# Patient Record
Sex: FEMALE | Race: WHITE | NOT HISPANIC OR LATINO | Employment: FULL TIME | URBAN - METROPOLITAN AREA
[De-identification: names, ages, dates, MRNs, and addresses within clinical notes are randomized per-mention and may not be internally consistent; named-entity substitution may affect disease eponyms.]

---

## 2023-11-10 PROBLEM — I27.0 PULMONARY HYPERTENSION, PRIMARY (MULTI): Status: ACTIVE | Noted: 2023-11-10

## 2023-11-10 PROBLEM — E03.9 HYPOTHYROIDISM: Status: ACTIVE | Noted: 2023-11-10

## 2023-11-10 PROBLEM — R60.9 EDEMA: Status: ACTIVE | Noted: 2023-11-10

## 2023-11-10 PROBLEM — R06.02 SHORTNESS OF BREATH ON EXERTION: Status: ACTIVE | Noted: 2023-11-10

## 2023-11-10 PROBLEM — R93.1 ABNORMAL ECHOCARDIOGRAM: Status: ACTIVE | Noted: 2023-11-10

## 2023-11-10 PROBLEM — R12 HEART BURN: Status: ACTIVE | Noted: 2023-11-10

## 2023-11-10 PROBLEM — I10 HYPERTENSION: Status: ACTIVE | Noted: 2023-11-10

## 2023-11-10 RX ORDER — FUROSEMIDE 40 MG/1
60 TABLET ORAL DAILY
COMMUNITY

## 2023-11-10 RX ORDER — LISINOPRIL 40 MG/1
40 TABLET ORAL DAILY
COMMUNITY

## 2023-11-10 RX ORDER — OMEPRAZOLE 10 MG/1
10 CAPSULE, DELAYED RELEASE ORAL
COMMUNITY

## 2023-11-10 RX ORDER — ETONOGESTREL AND ETHINYL ESTRADIOL .12; .015 MG/D; MG/D
1 RING VAGINAL
COMMUNITY

## 2023-11-10 RX ORDER — CETIRIZINE HYDROCHLORIDE 10 MG/1
1 TABLET ORAL DAILY PRN
COMMUNITY

## 2023-11-10 RX ORDER — NIFEDIPINE 30 MG/1
30 TABLET, EXTENDED RELEASE ORAL DAILY
COMMUNITY

## 2023-11-10 RX ORDER — LEVOTHYROXINE SODIUM 75 UG/1
75 TABLET ORAL DAILY
COMMUNITY

## 2023-11-10 RX ORDER — TADALAFIL 20 MG/1
2 TABLET ORAL DAILY
COMMUNITY
Start: 2020-08-11

## 2023-11-10 RX ORDER — MACITENTAN 10 MG/1
10 TABLET, FILM COATED ORAL DAILY
COMMUNITY
Start: 2020-08-10

## 2023-11-10 NOTE — PROGRESS NOTES
History Of Present Illness  Chiquis Thompson is a 43 y.o. female being seen today for routine idiopathic pulmonary hypertension follow up. She was last seen in clinic on 7/26/23. Patient was referred by Dr. Machado in 2021. She has transitioned from Uptravi to Remodulin via Remunity, tolerating well. No new medical issues or admissions since last visit.     PAH Treatment: Opsumit (6/3/2020), tadalafil (9/1/2020), Remodulin subcutaneous 57 ng/kg/min (started 2/14/23), diuretics and nocturnal oxygen - ordered 2L HS, not currently using.   Infusion site: SubqQ current site dry, without redness, has been in for two weeks. Previous three subcutaneous sites have become red, painful with bloody pus after ~four weeks. Improved with neosporin and bandage by patient, did not alert PAH office.   Treatment history:  Selexipag to Remodulin subcutaneous Feb 2023.     11/15/23 - SOB and endurance stable, not worsened. Infusion side effects with increases: having diarrhea, but controlled after now starting using immodium ~every other day. Denies N/V/HA/jaw pain. Mild flushing. 6MWT today.      Past Medical History  Patient Active Problem List   Diagnosis    Abnormal echocardiogram    Edema    Heart burn    Hypertension    Hypothyroidism    Pulmonary hypertension, primary (CMS/HCC)    Shortness of breath on exertion        Surgical History  She has a past surgical history that includes Other surgical history (05/18/2020).     Social History  She has no history on file for tobacco use, alcohol use, and drug use.    Family History  No family history on file.     Medications      Current Outpatient Medications:     macitentan (Opsumit) 10 mg tablet tablet, Take 1 tablet (10 mg) by mouth once daily., Disp: , Rfl:     tadalafil (tadalafil, antihypertensive,) 20 mg tablet, Take 2 tablets (40 mg) by mouth once daily., Disp: , Rfl:     cetirizine (ZyrTEC) 10 mg tablet, Take 1 tablet (10 mg) by mouth once daily as needed., Disp: , Rfl:      EluRyng 0.12-0.015 mg/24 hr vaginal ring, Insert 1 each into the vagina every 28 (twenty-eight) days., Disp: , Rfl:     furosemide (Lasix) 40 mg tablet, Take 1.5 tablets (60 mg) by mouth once daily., Disp: , Rfl:     levothyroxine (Synthroid, Levoxyl) 75 mcg tablet, Take 1 tablet (75 mcg) by mouth once daily., Disp: , Rfl:     lisinopril 40 mg tablet, Take 1 tablet (40 mg) by mouth once daily., Disp: , Rfl:     NIFEdipine XL 30 mg 24 hr tablet, Take 1 tablet (30 mg) by mouth once daily., Disp: , Rfl:     omeprazole (PriLOSEC) 10 mg DR capsule, Take 1 capsule (10 mg) by mouth once daily in the morning. Take before meals., Disp: , Rfl:      Allergies  Patient has no allergy information on record.    Review of Systems   Constitutional:  Negative for fatigue.   Respiratory:  Positive for shortness of breath. Negative for cough, chest tightness and wheezing.         With exertion.    Cardiovascular:  Negative for chest pain, palpitations and leg swelling.   Gastrointestinal:  Positive for diarrhea. Negative for constipation, nausea and vomiting.   Neurological:  Negative for dizziness, syncope, light-headedness and headaches.       Last Recorded Vitals  Blood pressure 117/78, pulse 85, weight 93.6 kg (206 lb 6.4 oz), SpO2 95 %.     Physical Exam       Relevant Results            6MWT (11/15/23)      HR 85 - 134      Spo2 95 - 89      Yisel 0 - 6      607m     Echo (23) - RV moderately enlarged with preserved longitudinal systolic function. RA is mildly dilated.     6MWT (2023)  SpO2: 98-91% RA  HR:   Yisel: 0-5  Actual meters: 612; predicted 418 meters     6MWT (3/29/2023)  SpO2: 97-91% RA  HR:   Yisel: 0-6  Actual meters: 588; predicted 557m     Echo (23): moderately enlarged RV with normal function, mildly dilated RA         RHC (21): [at rest] PAp 62/23 (21), PW 14, C.O. 6.5 / C.I. 3.2; [post exercise] Pap 75/16 (48), WP 27   Assessment/Plan     1) Pulmonary - Echo, (-) V/Q, normal PFTs  "consistent with IPAH, FC 2+, some persistent edema now resolved with increased diuretic, significant initial improvement in walk, now stable. , denies further palpitations, no syncope. Has limiting side effects in up-titration of selexipag from 800-> 1000 ug forcing her to decrease back to 800 ug / 1000ug. She is now increased to 1600 ug with \"tolerable\" diarrhea. Patient not interested in Imodium.      No edema now with lasix 60mg daily and compression hose. Overall, patient better than presentation, performance data however seems better than echo and although improving, believe we are not at goal and unlikely to get there on current regimen. Last mPAP was 48 mm Hg. (August 2021) I advocated for infusion prostacyclin. Patient reluctant but considering.     Not at goal for modern guidelines as noted at last visit, her echo measurements are trivially worse today. I discussed the above. I discussed the fact that delay and/or deterioration can lead to non-recoverable losses and that I recommend infusion therapy in place of selexipag at this time. Succesfully transitioned to SC remodulin.            2) Cardiovascular - essential hypertension.     3) Endocrine  a. Hypothyroidism  b. Obesity (BMI = 37.2->36->34.22)     Plan     1) Continue macitentan and tadalafil.   2) Continue lasix to 60mg daily.  3) Labs to follow PAH, meds and diuretic use  4) Continue to escalate SC remodulin as tolerated at 7-10 day intervals for now.  5) Follow up 3-4 months with, 6 MW.   6) DO NOT SHOWER AND UNHOOK. CONSIDER MORE FREQUENT SITE ROTATION. PROMPTLY CALL OFFICE WITH INFECTION.         "

## 2023-11-15 ENCOUNTER — OFFICE VISIT (OUTPATIENT)
Dept: PULMONOLOGY | Facility: HOSPITAL | Age: 43
End: 2023-11-15
Payer: COMMERCIAL

## 2023-11-15 ENCOUNTER — HOSPITAL ENCOUNTER (OUTPATIENT)
Dept: RESPIRATORY THERAPY | Facility: HOSPITAL | Age: 43
Discharge: HOME | End: 2023-11-15
Payer: COMMERCIAL

## 2023-11-15 VITALS
WEIGHT: 206.4 LBS | SYSTOLIC BLOOD PRESSURE: 117 MMHG | OXYGEN SATURATION: 95 % | HEART RATE: 85 BPM | BODY MASS INDEX: 33.31 KG/M2 | DIASTOLIC BLOOD PRESSURE: 78 MMHG

## 2023-11-15 DIAGNOSIS — I27.20 PULMONARY HYPERTENSION (MULTI): ICD-10-CM

## 2023-11-15 DIAGNOSIS — R06.02 SHORTNESS OF BREATH ON EXERTION: ICD-10-CM

## 2023-11-15 DIAGNOSIS — I27.0 PULMONARY HYPERTENSION, PRIMARY (MULTI): ICD-10-CM

## 2023-11-15 PROBLEM — R00.2 PALPITATIONS: Status: ACTIVE | Noted: 2020-04-28

## 2023-11-15 PROBLEM — R42 LIGHT HEADEDNESS: Status: ACTIVE | Noted: 2020-04-21

## 2023-11-15 PROBLEM — I51.9 RIGHT VENTRICULAR DYSFUNCTION: Status: ACTIVE | Noted: 2020-05-04

## 2023-11-15 PROBLEM — E66.9 OBESITY (BMI 30-39.9): Status: ACTIVE | Noted: 2020-04-21

## 2023-11-15 PROBLEM — R09.02 HYPOXIA: Status: ACTIVE | Noted: 2021-02-12

## 2023-11-15 PROBLEM — I36.1 NONRHEUMATIC TRICUSPID VALVE REGURGITATION: Status: ACTIVE | Noted: 2020-05-04

## 2023-11-15 PROCEDURE — 94618 PULMONARY STRESS TESTING: CPT

## 2023-11-15 PROCEDURE — 3074F SYST BP LT 130 MM HG: CPT | Performed by: INTERNAL MEDICINE

## 2023-11-15 PROCEDURE — 99215 OFFICE O/P EST HI 40 MIN: CPT | Performed by: INTERNAL MEDICINE

## 2023-11-15 PROCEDURE — 3078F DIAST BP <80 MM HG: CPT | Performed by: INTERNAL MEDICINE

## 2023-11-15 RX ORDER — LIDOCAINE AND PRILOCAINE 25; 25 MG/G; MG/G
CREAM TOPICAL
COMMUNITY
Start: 2023-02-07 | End: 2024-03-21 | Stop reason: ALTCHOICE

## 2023-11-15 RX ORDER — TREPROSTINIL 5 MG/ML
2 INJECTION, SOLUTION INTRAVENOUS; SUBCUTANEOUS
COMMUNITY

## 2023-11-15 RX ORDER — TRIAMCINOLONE ACETONIDE 1 MG/G
CREAM TOPICAL
COMMUNITY
Start: 2023-02-06 | End: 2024-03-21 | Stop reason: ALTCHOICE

## 2023-11-15 ASSESSMENT — ENCOUNTER SYMPTOMS
NAUSEA: 0
HEADACHES: 0
DIARRHEA: 1
SHORTNESS OF BREATH: 1
CHEST TIGHTNESS: 0
FATIGUE: 0
COUGH: 0
VOMITING: 0
PALPITATIONS: 0
WHEEZING: 0
CONSTIPATION: 0
DIZZINESS: 0
LIGHT-HEADEDNESS: 0

## 2024-01-25 ENCOUNTER — TELEPHONE (OUTPATIENT)
Dept: PULMONOLOGY | Facility: HOSPITAL | Age: 44
End: 2024-01-25

## 2024-02-15 ENCOUNTER — APPOINTMENT (OUTPATIENT)
Dept: RESPIRATORY THERAPY | Facility: HOSPITAL | Age: 44
End: 2024-02-15
Payer: COMMERCIAL

## 2024-02-15 ENCOUNTER — APPOINTMENT (OUTPATIENT)
Dept: CARDIOLOGY | Facility: HOSPITAL | Age: 44
End: 2024-02-15
Payer: COMMERCIAL

## 2024-02-15 ENCOUNTER — APPOINTMENT (OUTPATIENT)
Dept: PULMONOLOGY | Facility: HOSPITAL | Age: 44
End: 2024-02-15
Payer: COMMERCIAL

## 2024-03-05 ASSESSMENT — ENCOUNTER SYMPTOMS
CONSTIPATION: 0
NAUSEA: 0
CHEST TIGHTNESS: 0
DIARRHEA: 1
LIGHT-HEADEDNESS: 0
FATIGUE: 0
WHEEZING: 0
PALPITATIONS: 0
SHORTNESS OF BREATH: 1
COUGH: 0
VOMITING: 0

## 2024-03-05 NOTE — PROGRESS NOTES
History Of Present Illness  Chiquis Thompson is a 43 y.o. female being seen today for routine idiopathic pulmonary hypertension follow up. She was last seen in clinic on 11/15/23. Patient was referred by Dr. Machado in 2021. She has transitioned from Uptravi to Remodulin via Remunity, tolerating well. No new medical issues or admissions since last visit.     PAH Treatment: Opsumit (6/3/2020), tadalafil (9/1/2020), Remodulin subcutaneous 57 ng/kg/min (started 2/14/23), diuretics and nocturnal oxygen - ordered 2L HS, not currently using.   Infusion site: SubqQ current site dry, without redness, has been in for two weeks.   Treatment history:  Selexipag to Remodulin subcutaneous Feb 2023.     3/21/24- SOB and endurance stable, not worsened. Infusion side effects with increases: having diarrhea, but controlled after now starting using immodium ~every other day. Denies N/V//jaw pain.  Always a headache. Mild flushing.  Lipoma removed from right upper abdomen  2 weeks ago. 6MWT, echo and labs today.          Past Medical History  Patient Active Problem List   Diagnosis    Abnormal echocardiogram    Edema    Heart burn    Hypertension    Hypothyroidism    Pulmonary hypertension, primary (CMS/HCC)    Shortness of breath on exertion    Hypoxia    Light headedness    Obesity (BMI 30-39.9)    Palpitations    Pulmonary hypertension (CMS/HCC)    Right ventricular dysfunction    Nonrheumatic tricuspid valve regurgitation        Surgical History  She has a past surgical history that includes Other surgical history (05/18/2020).     Social History  She reports that she has never smoked. She has never used smokeless tobacco. She reports that she does not drink alcohol and does not use drugs.    Family History  No family history on file.     Medications      Current Outpatient Medications:     cetirizine (ZyrTEC) 10 mg tablet, Take 1 tablet (10 mg) by mouth once daily as needed., Disp: , Rfl:     EluRyng 0.12-0.015 mg/24 hr vaginal ring,  "Insert 1 each into the vagina every 28 (twenty-eight) days., Disp: , Rfl:     furosemide (Lasix) 40 mg tablet, Take 1.5 tablets (60 mg) by mouth once daily., Disp: , Rfl:     levothyroxine (Synthroid, Levoxyl) 75 mcg tablet, Take 1 tablet (75 mcg) by mouth once daily., Disp: , Rfl:     lisinopril 40 mg tablet, Take 1 tablet (40 mg) by mouth once daily., Disp: , Rfl:     macitentan (Opsumit) 10 mg tablet tablet, Take 1 tablet (10 mg) by mouth once daily., Disp: , Rfl:     NIFEdipine XL 30 mg 24 hr tablet, Take 1 tablet (30 mg) by mouth once daily., Disp: , Rfl:     omeprazole (PriLOSEC) 10 mg DR capsule, Take 1 capsule (10 mg) by mouth once daily in the morning. Take before meals., Disp: , Rfl:     tadalafil (tadalafil, antihypertensive,) 20 mg tablet, Take 2 tablets (40 mg) by mouth once daily., Disp: , Rfl:     treprostinil (Remodulin) 5 mg/mL solution, 2 mL (10,000,000 ng)., Disp: , Rfl:      Allergies  Patient has no known allergies.    Review of Systems   Constitutional:  Negative for fatigue.   HENT:  Positive for congestion.    Respiratory:  Positive for shortness of breath. Negative for cough, chest tightness and wheezing.         With exertion.    Cardiovascular:  Negative for chest pain, palpitations and leg swelling.   Gastrointestinal:  Positive for diarrhea. Negative for constipation, nausea and vomiting.   Neurological:  Positive for headaches. Negative for dizziness, syncope and light-headedness.       Last Recorded Vitals  Blood pressure 111/55, pulse 85, height 1.676 m (5' 6\"), weight 94.3 kg (208 lb), SpO2 91 %.     Physical Exam  Vitals reviewed.   Constitutional:       Appearance: She is obese.   HENT:      Head: Normocephalic.      Nose: Nose normal.   Cardiovascular:      Rate and Rhythm: Normal rate and regular rhythm.      Pulses: Normal pulses.      Heart sounds: Normal heart sounds.   Pulmonary:      Effort: Pulmonary effort is normal.      Breath sounds: Normal breath sounds.   Abdominal: "      Palpations: Abdomen is soft.   Musculoskeletal:      Right lower leg: No edema.      Left lower leg: No edema.   Skin:     Findings: No rash.   Neurological:      General: No focal deficit present.      Mental Status: She is alert.   Psychiatric:         Mood and Affect: Mood normal.         Judgment: Judgment normal.            Relevant Results        6MWT (3/21/24)  HR   Spo2 95 - 89 on room air  Yisel 0 - 6  Meters 594        6MWT (11/15/23)      HR 85 - 134      Spo2 95 - 89      Yisel 0 - 6      607m     Echo (23) - RV moderately enlarged with preserved longitudinal systolic function. RA is mildly dilated.     6MWT (2023)  SpO2: 98-91% RA  HR:   Yisel: 0-5  Actual meters: 612; predicted 418 meters     6MWT (3/29/2023)  SpO2: 97-91% RA  HR:   Yisel: 0-6  Actual meters: 588; predicted 557m     Echo (23): moderately enlarged RV with normal function, mildly dilated RA         RHC (21): [at rest] PAp 62/23 (21), PW 14, C.O. 6.5 / C.I. 3.2; [post exercise] Pap 75/16 (48), WP 27   Assessment/Plan     1) Pulmonary - Echo, (-) V/Q, normal PFTs consistent with IPAH, FC 2+, some persistent edema now resolved with increased diuretic. Trial on triple oral which ultimately was transitioned to subcutaneous remodulin with improvement. Stable function today.      No edema now with lasix 60mg daily and compression hose. Overall, patient better than presentation, performance data however seems better than echo and although improving, believe we are not at goal and unlikely to get there on current regimen. Last mPAP was 48 mm Hg. (2021) I advocated for infusion prostacyclin. Patient reluctant but considering.     Not at goal for modern guidelines as noted at last visit, her echo measurements are trivially worse today. I discussed the above. I discussed the fact that delay and/or deterioration can lead to non-recoverable losses and that I recommend infusion therapy in place of selexipag  at this time. Succesfully transitioned to SC remodulin.            2) Cardiovascular - essential hypertension.     3) Endocrine  a. Hypothyroidism  b. Obesity (BMI = 37.2->36->34.22)     Plan     1) Continue macitentan and tadalafil.   2) Continue lasix to 60mg daily.  3) Labs to follow PAH, meds and diuretic use  4) No change remoduline  5) Follow up 3 months with 6 MW, echo , cath late summer.  6) DO NOT SHOWER AND UNHOOK. CONSIDER MORE FREQUENT SITE ROTATION. PROMPTLY CALL OFFICE WITH INFECTION.

## 2024-03-21 ENCOUNTER — APPOINTMENT (OUTPATIENT)
Dept: CARDIOLOGY | Facility: HOSPITAL | Age: 44
End: 2024-03-21
Payer: COMMERCIAL

## 2024-03-21 ENCOUNTER — OFFICE VISIT (OUTPATIENT)
Dept: PULMONOLOGY | Facility: HOSPITAL | Age: 44
End: 2024-03-21
Payer: COMMERCIAL

## 2024-03-21 ENCOUNTER — HOSPITAL ENCOUNTER (OUTPATIENT)
Dept: CARDIOLOGY | Facility: HOSPITAL | Age: 44
Discharge: HOME | End: 2024-03-21
Payer: COMMERCIAL

## 2024-03-21 ENCOUNTER — HOSPITAL ENCOUNTER (OUTPATIENT)
Dept: RESPIRATORY THERAPY | Facility: HOSPITAL | Age: 44
Discharge: HOME | End: 2024-03-21
Payer: COMMERCIAL

## 2024-03-21 ENCOUNTER — APPOINTMENT (OUTPATIENT)
Dept: RESPIRATORY THERAPY | Facility: HOSPITAL | Age: 44
End: 2024-03-21
Payer: COMMERCIAL

## 2024-03-21 VITALS
DIASTOLIC BLOOD PRESSURE: 55 MMHG | HEIGHT: 66 IN | WEIGHT: 208 LBS | OXYGEN SATURATION: 91 % | SYSTOLIC BLOOD PRESSURE: 111 MMHG | BODY MASS INDEX: 33.43 KG/M2 | HEART RATE: 85 BPM

## 2024-03-21 DIAGNOSIS — I27.0 IDIOPATHIC PAH (PULMONARY ARTERIAL HYPERTENSION) (MULTI): ICD-10-CM

## 2024-03-21 DIAGNOSIS — I27.0 PULMONARY HYPERTENSION, PRIMARY (MULTI): ICD-10-CM

## 2024-03-21 DIAGNOSIS — R06.02 SHORTNESS OF BREATH: ICD-10-CM

## 2024-03-21 PROCEDURE — 1036F TOBACCO NON-USER: CPT | Performed by: INTERNAL MEDICINE

## 2024-03-21 PROCEDURE — 93306 TTE W/DOPPLER COMPLETE: CPT | Performed by: STUDENT IN AN ORGANIZED HEALTH CARE EDUCATION/TRAINING PROGRAM

## 2024-03-21 PROCEDURE — 99215 OFFICE O/P EST HI 40 MIN: CPT | Performed by: INTERNAL MEDICINE

## 2024-03-21 PROCEDURE — 94618 PULMONARY STRESS TESTING: CPT | Performed by: STUDENT IN AN ORGANIZED HEALTH CARE EDUCATION/TRAINING PROGRAM

## 2024-03-21 PROCEDURE — 94618 PULMONARY STRESS TESTING: CPT

## 2024-03-21 PROCEDURE — 3074F SYST BP LT 130 MM HG: CPT | Performed by: INTERNAL MEDICINE

## 2024-03-21 PROCEDURE — 93306 TTE W/DOPPLER COMPLETE: CPT

## 2024-03-21 PROCEDURE — 3078F DIAST BP <80 MM HG: CPT | Performed by: INTERNAL MEDICINE

## 2024-03-21 ASSESSMENT — LIFESTYLE VARIABLES
HOW OFTEN DO YOU HAVE A DRINK CONTAINING ALCOHOL: MONTHLY OR LESS
AUDIT-C TOTAL SCORE: 1
SKIP TO QUESTIONS 9-10: 1
HOW MANY STANDARD DRINKS CONTAINING ALCOHOL DO YOU HAVE ON A TYPICAL DAY: 1 OR 2
HOW OFTEN DO YOU HAVE SIX OR MORE DRINKS ON ONE OCCASION: NEVER

## 2024-03-21 ASSESSMENT — ENCOUNTER SYMPTOMS
OCCASIONAL FEELINGS OF UNSTEADINESS: 0
HEADACHES: 1
LOSS OF SENSATION IN FEET: 0
DIZZINESS: 0
DEPRESSION: 0

## 2024-03-22 LAB
AORTIC VALVE MEAN GRADIENT: 8.6 MMHG
AORTIC VALVE PEAK VELOCITY: 1.99 M/S
AV PEAK GRADIENT: 15.8 MMHG
AVA (PEAK VEL): 1.97 CM2
AVA (VTI): 2.21 CM2
EJECTION FRACTION APICAL 4 CHAMBER: 71.3
EJECTION FRACTION: 68 %
LEFT ATRIUM VOLUME AREA LENGTH INDEX BSA: 36.5 ML/M2
LEFT VENTRICLE INTERNAL DIMENSION DIASTOLE: 4.83 CM (ref 3.5–6)
LEFT VENTRICULAR OUTFLOW TRACT DIAMETER: 2.2 CM
MITRAL VALVE E/A RATIO: 1.36
MITRAL VALVE E/E' RATIO: 8.25
RIGHT VENTRICLE FREE WALL PEAK S': 11.35 CM/S
RIGHT VENTRICLE PEAK SYSTOLIC PRESSURE: 59 MMHG
TRICUSPID ANNULAR PLANE SYSTOLIC EXCURSION: 2.1 CM

## 2024-03-26 DIAGNOSIS — E87.6 HYPOKALEMIA: ICD-10-CM

## 2024-03-26 RX ORDER — POTASSIUM CHLORIDE 20 MEQ/1
20 TABLET, EXTENDED RELEASE ORAL DAILY
Qty: 90 TABLET | Refills: 3 | Status: SHIPPED | OUTPATIENT
Start: 2024-03-26 | End: 2025-03-26

## 2024-03-27 ENCOUNTER — DOCUMENTATION (OUTPATIENT)
Dept: PULMONOLOGY | Facility: HOSPITAL | Age: 44
End: 2024-03-27
Payer: COMMERCIAL

## 2024-03-27 DIAGNOSIS — I27.0 PULMONARY HYPERTENSION, PRIMARY (MULTI): Primary | ICD-10-CM

## 2024-03-27 NOTE — PROGRESS NOTES
Received lab results from [facility]. Reviewed with Dr. Rodriguez who stated that he wants her to start taking KCl 20 meq with her furosemide due to mild hypokalemia. Called patient who verified understanding, and confirmed pharmacy for new prescription.

## 2024-03-28 ENCOUNTER — HOSPITAL ENCOUNTER (OUTPATIENT)
Dept: RESPIRATORY THERAPY | Facility: HOSPITAL | Age: 44
Discharge: HOME | End: 2024-03-28
Attending: INTERNAL MEDICINE
Payer: COMMERCIAL

## 2024-03-28 DIAGNOSIS — I27.0 IDIOPATHIC PAH (PULMONARY ARTERIAL HYPERTENSION) (MULTI): ICD-10-CM

## 2024-03-28 PROCEDURE — 94453 HAST W/SUPPL OXYGEN TITRJ: CPT

## 2024-03-28 PROCEDURE — 94453 HAST W/SUPPL OXYGEN TITRJ: CPT | Performed by: INTERNAL MEDICINE

## 2024-03-28 NOTE — PROCEDURES
Pt. Tolerated tight mask and sub-ambient FiO2 during Flight sim.  P.ox decreased to 87% after 14.5 min. on 0.152 FiO2 and O2 started to [place] under mask at 1 lpm.  P.ox quickly increased to >95%.  Pt. Denied dyspnea or dizziness throughout testing.  She did c/o stuffed up nose and sucked in mucous occasional through her nose.  Documentation report sent to Dr. TOMEKA Rodriguez for interpretation.

## 2024-03-29 ENCOUNTER — TELEPHONE (OUTPATIENT)
Dept: PULMONOLOGY | Facility: HOSPITAL | Age: 44
End: 2024-03-29
Payer: COMMERCIAL

## 2024-03-29 NOTE — TELEPHONE ENCOUNTER
Patient called back, let her know that the test showed she does in oxygen when flying. Patient does have an oxygen concentrator at home for nocturnal use, she is going to see who is currently supplying it and give us a call back so we can reach out to them about getting a prescription for TSA compliant O2 tank.

## 2024-03-29 NOTE — TELEPHONE ENCOUNTER
Left message for patient to discuss Flight Sim testing. Need to discuss current oxygen needs and flight oxygen needs. Waiting for patient to call back.

## 2024-04-02 DIAGNOSIS — I27.0 PULMONARY HYPERTENSION, PRIMARY (MULTI): Primary | ICD-10-CM

## 2024-04-03 ENCOUNTER — TELEPHONE (OUTPATIENT)
Dept: PULMONOLOGY | Facility: HOSPITAL | Age: 44
End: 2024-04-03
Payer: COMMERCIAL

## 2024-04-03 NOTE — TELEPHONE ENCOUNTER
Flight simulation test done. Pt requested letter for TSA to fly with oxygen. Orders faxed to [employer] for oxygen concentrator and supplies.

## 2024-04-15 ENCOUNTER — DOCUMENTATION (OUTPATIENT)
Dept: PULMONOLOGY | Facility: HOSPITAL | Age: 44
End: 2024-04-15
Payer: COMMERCIAL

## 2024-04-15 NOTE — PROGRESS NOTES
Patient called in requesting a physician consent form for use of a portable oxygen concentrator form through [employer] for upcoming travel.  Will send patient signed copy of form in the mail.

## 2024-05-07 ENCOUNTER — TELEPHONE (OUTPATIENT)
Dept: PULMONOLOGY | Facility: HOSPITAL | Age: 44
End: 2024-05-07
Payer: COMMERCIAL

## 2024-05-07 NOTE — TELEPHONE ENCOUNTER
Called patient to check and verify that she had her oxygen tank for her upcoming flight. Patient verified it was being delivered either Wednesday or Thursday of this week and she would have it for her flight. Her insurance company denied the request, so she had to go through a different company and rent the oxygen tank.

## 2024-06-06 DIAGNOSIS — I27.0 PULMONARY HYPERTENSION, PRIMARY (MULTI): Primary | ICD-10-CM

## 2024-06-06 DIAGNOSIS — R06.02 SHORTNESS OF BREATH ON EXERTION: ICD-10-CM

## 2024-06-10 ENCOUNTER — DOCUMENTATION (OUTPATIENT)
Dept: PULMONOLOGY | Facility: HOSPITAL | Age: 44
End: 2024-06-10
Payer: COMMERCIAL

## 2024-06-13 NOTE — PROGRESS NOTES
History Of Present Illness  Chiquis Thompson is a 43 y.o. female presenting with  routine idiopathic pulmonary hypertension follow up. She was last seen in clinic on 03/21/24. Patient was referred by Dr. Machado in 2021. She has transitioned from Uptravi to Remodulin via Remunity, tolerating well. No new medical issues or admissions since last visit.  . Patient is NYHA Functional Class 2 and WHO Group 1.     3/21/24- SOB and endurance stable, not worsened. Infusion side effects with increases: having diarrhea, but controlled after now starting using immodium ~every other day. Denies N/V//jaw pain.  Always a headache. Mild flushing.  Lipoma removed from right upper abdomen  2 weeks ago. 6MWT, echo and labs today.     PAH Treatment:   Opsumit (6/3/2020)  Tadalafil (9/1/2020)  Remodulin subcutaneous 57 ng/kg/min (started 2/14/23),   Diuretics  nocturnal oxygen - ordered 2L HS, not currently using.     Infusion site: SubqQ current site dry, with slight redness, changed last Tuesday.   Treatment history:  Selexipag to Remodulin subcutaneous Feb 2023.       06/13/24  Testing today includes labs and 6MWT    Interval History     Patient reporting feeling dizzy and lightheaded after beginning torsemide. Spoke with cardiologist who decreased dose from 40mg to 20mg and symptoms. Changed subcutaneous remodulin last Tuesday. Site with a little redness but reports has improved since first change. Feels like SOB is about the same since last visit.     Past Medical History  Patient Active Problem List   Diagnosis    Abnormal echocardiogram    Edema    Heart burn    Hypertension    Hypothyroidism    Pulmonary hypertension, primary (Multi)    Shortness of breath on exertion    Hypoxia    Light headedness    Obesity (BMI 30-39.9)    Palpitations    Pulmonary hypertension (Multi)    Right ventricular dysfunction    Nonrheumatic tricuspid valve regurgitation        Surgical History  She has a past surgical history that includes Other  surgical history (05/18/2020).     Social History  She reports that she has never smoked. She has never used smokeless tobacco. She reports that she does not drink alcohol and does not use drugs.    Family History  No family history on file.     Medications      Current Outpatient Medications:     cetirizine (ZyrTEC) 10 mg tablet, Take 1 tablet (10 mg) by mouth once daily as needed., Disp: , Rfl:     EluRyng 0.12-0.015 mg/24 hr vaginal ring, Insert 1 each into the vagina every 28 (twenty-eight) days., Disp: , Rfl:     furosemide (Lasix) 40 mg tablet, Take 1.5 tablets (60 mg) by mouth once daily., Disp: , Rfl:     levothyroxine (Synthroid, Levoxyl) 75 mcg tablet, Take 1 tablet (75 mcg) by mouth once daily., Disp: , Rfl:     lisinopril 40 mg tablet, Take 1 tablet (40 mg) by mouth once daily., Disp: , Rfl:     macitentan (Opsumit) 10 mg tablet tablet, Take 1 tablet (10 mg) by mouth once daily., Disp: , Rfl:     NIFEdipine XL 30 mg 24 hr tablet, Take 1 tablet (30 mg) by mouth once daily., Disp: , Rfl:     omeprazole (PriLOSEC) 10 mg DR capsule, Take 1 capsule (10 mg) by mouth once daily in the morning. Take before meals., Disp: , Rfl:     potassium chloride CR (Klor-Con M20) 20 mEq ER tablet, Take 1 tablet (20 mEq) by mouth once daily. Do not crush or chew., Disp: 90 tablet, Rfl: 3    tadalafil (tadalafil, antihypertensive,) 20 mg tablet, Take 2 tablets (40 mg) by mouth once daily., Disp: , Rfl:     treprostinil (Remodulin) 5 mg/mL solution, 2 mL (10,000,000 ng)., Disp: , Rfl:      Allergies  Patient has no known allergies.    Review of Systems   Constitutional:  Negative for appetite change, chills, fatigue, fever and unexpected weight change.   Respiratory:  Negative for cough, chest tightness, shortness of breath and wheezing.    Cardiovascular:  Negative for chest pain, palpitations and leg swelling.   Gastrointestinal:  Negative for abdominal distention, abdominal pain, constipation, diarrhea, nausea and vomiting.    Neurological:  Positive for dizziness and light-headedness. Negative for syncope and headaches.       Last Recorded Vitals  There were no vitals taken for this visit.  Vitals:    24 1022   BP: 145/71   Pulse: 77   SpO2: 97%         Physical Exam  HENT:      Head: Normocephalic.   Cardiovascular:      Rate and Rhythm: Normal rate and regular rhythm.   Pulmonary:      Effort: Pulmonary effort is normal.      Breath sounds: Normal breath sounds.   Abdominal:      General: Bowel sounds are normal.      Palpations: Abdomen is soft.   Musculoskeletal:      Right lower leg: No edema.      Left lower leg: No edema.   Skin:     Findings: No rash.   Neurological:      General: No focal deficit present.      Mental Status: She is alert.   Psychiatric:         Mood and Affect: Mood normal.         Judgment: Judgment normal.            Relevant Results    6MWT (2024) room air  SPO2: 97/91  HR: 77/134  Yisel: 0/2  Meters: 610    6MWT (3/21/24)  HR   Spo2 95 - 89 on room air  Yisel 0 - 6  Meters 594    6MWT (11/15/23)  HR 85 - 134  Spo2 95 - 89  Yisel 0 - 6  607m    6MWT (2023)  SpO2: 98-91% RA  HR:   Yisel: 0-5  Actual meters: 612; predicted 418 meters     6MWT (3/29/2023)  SpO2: 97-91% RA  HR:   Yisel: 0-6  Actual meters: 588; predicted 557m    Echo (2024)  Right Ventricle: The right ventricle is upper limits of normal in size. There is normal right ventricular global systolic function.  Right Atrium: The right atrium is normal in size.    Echo (3/21/2024)  Right Ventricle: The right ventricle is mildly enlarged. There is normal right ventricular global systolic function.  Right Atrium: The right atrium is mildly dilated.    Echo (23)    RV moderately enlarged with preserved longitudinal systolic function.   RA is mildly dilated.      Echo (23)  moderately enlarged RV with normal function,  RA mildly dilated      RHC (21)  [at rest]   Pap: 62/23 (21)  PW: 14  CO/CI:  6.5/3.2  [post exercise]  Pap 75/16 (48),   PW 27      Assessment/Plan   1) Pulmonary - Echo, (-) V/Q, normal PFTs consistent with IPAH, FC 2+, some persistent edema now resolved with increased diuretic. Trial on triple oral which ultimately was transitioned to subcutaneous remodulin with improvement. Stable function today. Still plan to RHC later this summer to assess for sotatercept.     No edema now with torsemide and aldactone. Overall, patient better than last year, performance data however seems better than echo and although improving, believe we are not at goal and unlikely to get there on current regimen. Last mPAP was 48 mm Hg. (August 2021) I advocated for infusion prostacyclin. Patient reluctant but ultimately agreed in 2023, subcutaneous remodulin from selexipag.                 2) Cardiovascular - essential hypertension.     3) Endocrine  a. Hypothyroidism  b. Obesity (BMI = 37.2->36->34.22)     Plan    1) Continue macitentan and tadalafil, remodulin  2) Cath late summer before follow up   3) Follow up 3 months with 6 MW, will review cath and discuss sotatercept if indicated.     Discussed with patient, last week in August is vacation and likely good timing for Right heart cath.                          PAST MEDICAL HISTORY:  Subarachnoid hemorrhage

## 2024-06-18 ENCOUNTER — HOSPITAL ENCOUNTER (OUTPATIENT)
Dept: CARDIOLOGY | Facility: HOSPITAL | Age: 44
Discharge: HOME | End: 2024-06-18
Payer: COMMERCIAL

## 2024-06-18 DIAGNOSIS — R06.02 SHORTNESS OF BREATH ON EXERTION: ICD-10-CM

## 2024-06-18 DIAGNOSIS — I27.0 PULMONARY HYPERTENSION, PRIMARY (MULTI): ICD-10-CM

## 2024-06-18 PROCEDURE — 93306 TTE W/DOPPLER COMPLETE: CPT

## 2024-06-18 PROCEDURE — 2500000004 HC RX 250 GENERAL PHARMACY W/ HCPCS (ALT 636 FOR OP/ED): Performed by: INTERNAL MEDICINE

## 2024-06-18 PROCEDURE — 93306 TTE W/DOPPLER COMPLETE: CPT | Performed by: INTERNAL MEDICINE

## 2024-06-19 LAB
AORTIC VALVE MEAN GRADIENT: 11.7 MMHG
AORTIC VALVE PEAK VELOCITY: 2.24 M/S
AV PEAK GRADIENT: 20.1 MMHG
AVA (PEAK VEL): 2.22 CM2
AVA (VTI): 2.38 CM2
EJECTION FRACTION APICAL 4 CHAMBER: 63.8
LEFT ATRIUM VOLUME AREA LENGTH INDEX BSA: 25.1 ML/M2
LEFT VENTRICLE INTERNAL DIMENSION DIASTOLE: 4.64 CM (ref 3.5–6)
LEFT VENTRICULAR OUTFLOW TRACT DIAMETER: 1.99 CM
LV EJECTION FRACTION BIPLANE: 62 %
MITRAL VALVE E/A RATIO: 0.81
MITRAL VALVE E/E' RATIO: 5.56
RIGHT VENTRICLE FREE WALL PEAK S': 20.14 CM/S
TRICUSPID ANNULAR PLANE SYSTOLIC EXCURSION: 2.9 CM

## 2024-06-24 ENCOUNTER — APPOINTMENT (OUTPATIENT)
Dept: RESPIRATORY THERAPY | Facility: HOSPITAL | Age: 44
End: 2024-06-24
Payer: COMMERCIAL

## 2024-06-24 ENCOUNTER — HOSPITAL ENCOUNTER (OUTPATIENT)
Dept: RESPIRATORY THERAPY | Facility: HOSPITAL | Age: 44
Discharge: HOME | End: 2024-06-24
Payer: COMMERCIAL

## 2024-06-24 ENCOUNTER — OFFICE VISIT (OUTPATIENT)
Dept: PULMONOLOGY | Facility: HOSPITAL | Age: 44
End: 2024-06-24
Payer: COMMERCIAL

## 2024-06-24 VITALS
SYSTOLIC BLOOD PRESSURE: 145 MMHG | WEIGHT: 201.6 LBS | HEIGHT: 66 IN | DIASTOLIC BLOOD PRESSURE: 71 MMHG | OXYGEN SATURATION: 97 % | HEART RATE: 77 BPM | BODY MASS INDEX: 32.4 KG/M2

## 2024-06-24 DIAGNOSIS — Z79.899 LONG-TERM USE OF HIGH-RISK MEDICATION: ICD-10-CM

## 2024-06-24 DIAGNOSIS — I27.20 PULMONARY HYPERTENSION (MULTI): Primary | ICD-10-CM

## 2024-06-24 DIAGNOSIS — I27.20 PULMONARY HYPERTENSION (MULTI): ICD-10-CM

## 2024-06-24 DIAGNOSIS — I27.0 IDIOPATHIC PAH (PULMONARY ARTERIAL HYPERTENSION) (MULTI): ICD-10-CM

## 2024-06-24 PROCEDURE — 94618 PULMONARY STRESS TESTING: CPT

## 2024-06-24 PROCEDURE — 1036F TOBACCO NON-USER: CPT | Performed by: INTERNAL MEDICINE

## 2024-06-24 PROCEDURE — 99215 OFFICE O/P EST HI 40 MIN: CPT | Performed by: INTERNAL MEDICINE

## 2024-06-24 PROCEDURE — 3078F DIAST BP <80 MM HG: CPT | Performed by: INTERNAL MEDICINE

## 2024-06-24 PROCEDURE — 3077F SYST BP >= 140 MM HG: CPT | Performed by: INTERNAL MEDICINE

## 2024-06-24 PROCEDURE — 94618 PULMONARY STRESS TESTING: CPT | Performed by: INTERNAL MEDICINE

## 2024-06-24 RX ORDER — SPIRONOLACTONE 25 MG/1
25 TABLET ORAL
COMMUNITY
Start: 2024-05-29 | End: 2025-05-29

## 2024-06-24 RX ORDER — TORSEMIDE 20 MG/1
40 TABLET ORAL
COMMUNITY
Start: 2024-05-29 | End: 2025-05-29

## 2024-06-24 ASSESSMENT — ENCOUNTER SYMPTOMS
DIARRHEA: 0
CHEST TIGHTNESS: 0
UNEXPECTED WEIGHT CHANGE: 0
NAUSEA: 0
VOMITING: 0
ABDOMINAL PAIN: 0
CHILLS: 0
WHEEZING: 0
COUGH: 0
LIGHT-HEADEDNESS: 1
HEADACHES: 0
APPETITE CHANGE: 0
FATIGUE: 0
ABDOMINAL DISTENTION: 0
SHORTNESS OF BREATH: 0
PALPITATIONS: 0
FEVER: 0
DIZZINESS: 1
CONSTIPATION: 0

## 2024-06-25 ENCOUNTER — HOSPITAL ENCOUNTER (OUTPATIENT)
Facility: HOSPITAL | Age: 44
Setting detail: OUTPATIENT SURGERY
End: 2024-06-25
Attending: INTERNAL MEDICINE | Admitting: INTERNAL MEDICINE
Payer: COMMERCIAL

## 2024-06-25 DIAGNOSIS — I27.20 PULMONARY HYPERTENSION (MULTI): ICD-10-CM

## 2024-06-25 DIAGNOSIS — R06.02 SOB (SHORTNESS OF BREATH): ICD-10-CM

## 2024-07-30 ENCOUNTER — DOCUMENTATION (OUTPATIENT)
Dept: PULMONOLOGY | Facility: HOSPITAL | Age: 44
End: 2024-07-30
Payer: COMMERCIAL

## 2024-07-30 NOTE — PROGRESS NOTES
Patient rescheduled for right heart cath for 8/28 at [facility],  1st case. Pt aware.

## 2024-09-20 ENCOUNTER — DOCUMENTATION (OUTPATIENT)
Dept: PULMONOLOGY | Facility: HOSPITAL | Age: 44
End: 2024-09-20
Payer: COMMERCIAL

## 2024-09-20 NOTE — PROGRESS NOTES
Pt called office to check in regarding right heart cath scheduling at [facility]. RN reported we have been in contact with [facility] Coordinators regarding this issue and will update her next week.

## 2024-09-23 ENCOUNTER — DOCUMENTATION (OUTPATIENT)
Dept: PULMONOLOGY | Facility: HOSPITAL | Age: 44
End: 2024-09-23
Payer: COMMERCIAL

## 2024-10-16 ENCOUNTER — HOSPITAL ENCOUNTER (OUTPATIENT)
Dept: CARDIOLOGY | Facility: HOSPITAL | Age: 44
Discharge: HOME | End: 2024-10-16
Payer: COMMERCIAL

## 2024-10-28 ENCOUNTER — OFFICE VISIT (OUTPATIENT)
Dept: PULMONOLOGY | Facility: HOSPITAL | Age: 44
End: 2024-10-28
Payer: COMMERCIAL

## 2024-10-28 ENCOUNTER — HOSPITAL ENCOUNTER (OUTPATIENT)
Dept: RESPIRATORY THERAPY | Facility: HOSPITAL | Age: 44
Discharge: HOME | End: 2024-10-28
Payer: COMMERCIAL

## 2024-10-28 VITALS
HEIGHT: 67 IN | BODY MASS INDEX: 31.23 KG/M2 | SYSTOLIC BLOOD PRESSURE: 105 MMHG | DIASTOLIC BLOOD PRESSURE: 69 MMHG | HEART RATE: 91 BPM | OXYGEN SATURATION: 95 % | WEIGHT: 199 LBS

## 2024-10-28 DIAGNOSIS — I27.20 PULMONARY HYPERTENSION (MULTI): Primary | ICD-10-CM

## 2024-10-28 DIAGNOSIS — Z79.899 LONG-TERM USE OF HIGH-RISK MEDICATION: ICD-10-CM

## 2024-10-28 DIAGNOSIS — R06.02 SOB (SHORTNESS OF BREATH): ICD-10-CM

## 2024-10-28 DIAGNOSIS — I27.20 PULMONARY HYPERTENSION (MULTI): ICD-10-CM

## 2024-10-28 DIAGNOSIS — R06.02 SHORTNESS OF BREATH: ICD-10-CM

## 2024-10-28 PROCEDURE — 99215 OFFICE O/P EST HI 40 MIN: CPT | Mod: 25 | Performed by: INTERNAL MEDICINE

## 2024-10-28 PROCEDURE — 3008F BODY MASS INDEX DOCD: CPT | Performed by: INTERNAL MEDICINE

## 2024-10-28 PROCEDURE — 3078F DIAST BP <80 MM HG: CPT | Performed by: INTERNAL MEDICINE

## 2024-10-28 PROCEDURE — 94618 PULMONARY STRESS TESTING: CPT | Performed by: STUDENT IN AN ORGANIZED HEALTH CARE EDUCATION/TRAINING PROGRAM

## 2024-10-28 PROCEDURE — 94618 PULMONARY STRESS TESTING: CPT

## 2024-10-28 PROCEDURE — 3074F SYST BP LT 130 MM HG: CPT | Performed by: INTERNAL MEDICINE

## 2024-10-28 PROCEDURE — 99215 OFFICE O/P EST HI 40 MIN: CPT | Performed by: INTERNAL MEDICINE

## 2024-10-28 ASSESSMENT — ENCOUNTER SYMPTOMS
SHORTNESS OF BREATH: 0
DIZZINESS: 1
ALLERGIC/IMMUNOLOGIC NEGATIVE: 1
HEMATOLOGIC/LYMPHATIC NEGATIVE: 1
LIGHT-HEADEDNESS: 0
DIARRHEA: 1
PALPITATIONS: 0
MUSCULOSKELETAL NEGATIVE: 1
HEADACHES: 0
PSYCHIATRIC NEGATIVE: 1
CONSTITUTIONAL NEGATIVE: 1
EYES NEGATIVE: 1
ENDOCRINE NEGATIVE: 1

## 2024-10-28 ASSESSMENT — PAIN SCALES - GENERAL: PAINLEVEL_OUTOF10: 0-NO PAIN

## 2024-11-01 ENCOUNTER — DOCUMENTATION (OUTPATIENT)
Dept: PULMONOLOGY | Facility: HOSPITAL | Age: 44
End: 2024-11-01
Payer: COMMERCIAL

## 2024-11-13 DIAGNOSIS — I27.20 PULMONARY HYPERTENSION (MULTI): ICD-10-CM

## 2024-11-13 RX ORDER — TADALAFIL 20 MG/1
40 TABLET ORAL DAILY
Qty: 10 TABLET | Refills: 11 | Status: SHIPPED | OUTPATIENT
Start: 2024-11-13 | End: 2024-11-13 | Stop reason: SDUPTHER

## 2024-11-14 RX ORDER — TADALAFIL 20 MG/1
40 TABLET ORAL DAILY
Qty: 60 TABLET | Refills: 11 | Status: SHIPPED | OUTPATIENT
Start: 2024-11-14 | End: 2025-11-14

## 2024-12-02 ENCOUNTER — DOCUMENTATION (OUTPATIENT)
Dept: PULMONOLOGY | Facility: HOSPITAL | Age: 44
End: 2024-12-02
Payer: COMMERCIAL

## 2024-12-02 NOTE — PROGRESS NOTES
Patient called office.   Medication: subcutaneous Remodulin  Vial Concentration: 10 mg/ml MDV    Current dose:          81.63 ng/kg/min            48 uls/hr          Cartridge volume 3            Hours Lasting 53.21    Last increase on: 11/23/2024    Weight 98 kg    Titration plan: Increase every 2 weeks per Dr. Rodriguez. Patient has been increasing every 6 days on her own and tolerating well.    Side Effects (flushing/diarrhea/increased SOB/headache/nausea/1st bite jaw pain): none    Plan: Advised patient to call office with next increase

## 2024-12-02 NOTE — PROGRESS NOTES
Opsynvi approved     Auth# [id number]    Valid from: 11/13/24-11/12/2025    Addendum:  [facility] notified, they will notify [facility] of approval

## 2025-01-07 ENCOUNTER — TELEPHONE (OUTPATIENT)
Dept: PULMONOLOGY | Facility: HOSPITAL | Age: 45
End: 2025-01-07
Payer: COMMERCIAL

## 2025-01-10 ENCOUNTER — DOCUMENTATION (OUTPATIENT)
Dept: PULMONOLOGY | Facility: HOSPITAL | Age: 45
End: 2025-01-10
Payer: COMMERCIAL

## 2025-01-10 NOTE — PROGRESS NOTES
Patient called office.   Medication: subcutaneous Remodulin  Vial Concentration    Current dose:          86.73 ng/kg/min            51 uls/hr          Cartridge volume 3            Hours Lasting 49.96    Weight 98 kg    Titration plan: Increase every 2 weeks, last increased on 1/3/25    Side Effects (flushing/diarrhea/increased SOB/headache/nausea/1st bite jaw pain): None    Plan: Will need another dosing guide, discuss with Dr. Rodriguez

## 2025-01-14 ENCOUNTER — DOCUMENTATION (OUTPATIENT)
Dept: PULMONOLOGY | Facility: HOSPITAL | Age: 45
End: 2025-01-14
Payer: COMMERCIAL

## 2025-01-17 NOTE — PROGRESS NOTES
History Of Present Illness  Chiquis Thompson is a 44 y.o. female presenting with idiopathic pulmonary arterial hypertension. Patient is NYHA Functional Class 1-2 and WHO Group 1. She was last seen in clinic on 10/18/2024. Patient was originally referred by Dr. Machado in 2021. She has transitioned from Uptravi to Remodulin via Remunity, tolerating well.     PAH Treatment:  Remodulin subcutaneous, 86.73 ng/kg/min, 51 uL/hr (2/14/2023)   Opsynvi (12/2024)   Infusion site: Abdomen, CDI  Treatment history:   Sildenafil (10/31/2020-3/2023) failed, missing afternoon doses  Selexipag 1600 mcg (10/31/2020-3/2023) transition to Remodulin subcutaneous  Opsumit (6/3/2020-12/2024)  Tadalafil (9/1/2020-12/2024)     Today's testing includes Echo, 6 MWT and Labs    Interval History   Patient states SOB has remained the same. Has intermittent dizziness self resolving. Has headaches weekly which patient endorses is normal. Feels relatively the same as last visit.     Past Medical History  Patient Active Problem List   Diagnosis    Abnormal echocardiogram    Edema    Heart burn    Hypertension    Hypothyroidism    Pulmonary hypertension, primary (Multi)    Shortness of breath on exertion    Hypoxia    Light headedness    Obesity (BMI 30-39.9)    Palpitations    Pulmonary hypertension (Multi)    Right ventricular dysfunction    Nonrheumatic tricuspid valve regurgitation    Long-term use of high-risk medication    SOB (shortness of breath)        Surgical History  She has a past surgical history that includes Other surgical history (05/18/2020).     Social History  She reports that she has never smoked. She has never used smokeless tobacco. She reports that she does not drink alcohol and does not use drugs.    Family History  No family history on file.     Medications  Current Outpatient Medications:     cetirizine (ZyrTEC) 10 mg tablet, Take 1 tablet (10 mg) by mouth once daily as needed., Disp: , Rfl:     EluRyng 0.12-0.015 mg/24 hr  vaginal ring, Insert 1 each into the vagina every 28 (twenty-eight) days., Disp: , Rfl:     levothyroxine (Synthroid, Levoxyl) 75 mcg tablet, Take 1 tablet (75 mcg) by mouth once daily., Disp: , Rfl:     lisinopril 40 mg tablet, Take 1 tablet (40 mg) by mouth once daily. (Patient taking differently: Take 0.5 tablets (20 mg) by mouth once daily.), Disp: , Rfl:     macitentan (Opsumit) 10 mg tablet tablet, Take 1 tablet (10 mg) by mouth once daily., Disp: , Rfl:     omeprazole (PriLOSEC) 10 mg DR capsule, Take 1 capsule (10 mg) by mouth once daily in the morning. Take before meals., Disp: , Rfl:     potassium chloride CR (Klor-Con M20) 20 mEq ER tablet, Take 1 tablet (20 mEq) by mouth once daily. Do not crush or chew., Disp: 90 tablet, Rfl: 3    tadalafil (tadalafil, antihypertensive,) 20 mg tablet, Take 2 tablets (40 mg) by mouth once daily., Disp: 60 tablet, Rfl: 11    torsemide (Demadex) 20 mg tablet, Take 2 tablets (40 mg) by mouth once daily., Disp: , Rfl:     treprostinil (Remodulin) 5 mg/mL solution, 2 mL (10,000,000 ng)., Disp: , Rfl:     Current Facility-Administered Medications:     perflutren lipid microspheres (Definity) injection 0.5-10 mL of dilution, 0.5-10 mL of dilution, intravenous, Once in imaging, Last Rodriguez, DO     Allergies  Patient has no known allergies.    Review of Systems   Constitutional:  Negative for activity change, appetite change, chills, fatigue, fever and unexpected weight change.   Respiratory:  Positive for shortness of breath. Negative for cough, chest tightness and wheezing.    Cardiovascular:  Negative for chest pain, palpitations and leg swelling.   Gastrointestinal:  Negative for abdominal distention, abdominal pain, constipation, diarrhea, nausea and vomiting.   Neurological:  Positive for dizziness and headaches. Negative for syncope and light-headedness.       Last Recorded Vitals  There were no vitals taken for this visit.     Physical Exam  Constitutional:        Appearance: She is obese.   Eyes:      Pupils: Pupils are equal, round, and reactive to light.   Cardiovascular:      Rate and Rhythm: Normal rate and regular rhythm.      Heart sounds: Murmur heard.   Pulmonary:      Breath sounds: Normal breath sounds.   Abdominal:      General: Abdomen is flat.   Skin:     Findings: No rash.   Neurological:      General: No focal deficit present.   Psychiatric:         Mood and Affect: Mood normal.         Judgment: Judgment normal.            Relevant Results    6MWT (2025)  SP02-99%-91% on RA  HR-   YISEL- 0-4  Actual Meters- 596m    Echo (2025)   pending    6MWT (10/28/2024)  AD66-65-21% on room air  HR-  YISEL-0-4  Actual Meters-610m       RHC (10/14/2024) [facility]  PAP-55/21 (35)  PWP-15  CO/CI-6.1/3.1     6MWT (2024) room air  SPO2: 97/91  HR: 77/134  Yisel: 0/2  Meters: 610     Echo (2024)  Right Ventricle: The right ventricle is upper limits of normal in size. There is normal right ventricular global systolic function.  Right Atrium: The right atrium is normal in size.     6MWT (3/21/2024)  HR   Spo2 95 - 89 on room air  Yisel 0 - 6  Meters 594     Echo (3/21/2024)  Right Ventricle: The right ventricle is mildly enlarged. There is normal right ventricular global systolic function.  Right Atrium: The right atrium is mildly dilated.     6MWT (11/15/2023)  HR 85 - 134  Spo2 95 - 89  Yisel 0 - 6  607m     6MWT (2023)  SpO2: 98-91% RA  HR:   Yisel: 0-5  Actual meters: 612; predicted 418 meters     Echo (2023)    RV moderately enlarged with preserved longitudinal systolic function.   RA is mildly dilated.      6MWT (3/29/2023)  SpO2: 97-91% RA  HR:   Yisel: 0-6  Actual meters: 588; predicted 557m     Echo (2023)  moderately enlarged RV with normal function,  RA mildly dilated      RHC (2021)  [at rest]   Pap: 62/23 (21)  PW: 14  CO/CI: 6.5/3.2  [post exercise]  Pap 75/16 (48),   PW 27     RHC (2020)  PAP:  67/22(39)  PWP: 6  CO/CI: 5.3/2.4     CT chest WO IV Contrast (5/27/2020)   IMPRESSION:  1. Enlarged pulmonary artery and enlarged right ventricle, please  correlate with clinical concern of pulmonary arterial hypertension.  No evidence of pneumothorax, pleural effusion, consolidation.  2. Small pericardial effusion.  3. Slightly prominent cardiomediastinal and upper abdominal lymph  nodes measuring up to 6 mm, likely reactive in nature.  4. 9 mm left inferior pole thyroid nodule. Consider correlation with  thyroid ultrasound.    Assessment/Plan     1) Pulmonary - Echo, (-) V/Q, normal PFTs consistent with IPAH, FC 2+, some persistent edema now resolved with increased diuretic. Trial on triple oral which ultimately was transitioned to subcutaneous remodulin with improvement. Stable function today.      No edema now with torsemide and aldactone. Overall, patient better than last year, performance data however seems better than echo and although improving, believe we are not at goal and unlikely to get there on current regimen. Last mPAP was 48 mm Hg. (August 2021) I advocated for infusion prostacyclin. Patient reluctant but ultimately agreed in 2023, subcutaneous remodulin from selexipag.      10/28/2024 -   FC 1-2, very, very close to goal, significant improvement in mPAP to 32 mm Hg with PVR 3.3 MOLINA. Options at this point  - Increase prostacyclin and re-evaluate with time  - Consider adempas however 3x daily dosing in actively working patient will not favor compliance. Had to go to tadalfil due to sildenafil dose missing. Also has had lower BP with some light-headedness. Not a good candidate for riociguat.  - Sotatercept an option but discussed side effects including serious and non-serious bleeding. At this point, patient not inclined to assume risk. I agree given her status and low PVR.   - Experimental meds an option in future as well.  1/29/2025 review  (10/14/2025)- Interval cath with mPAP =35 . PVR 3.3, CI  =3.1, Patient no different , still with limitation, Echo today similar to March 2024. With RV:LV by my eye close to 1:1. Her echo and limitation/complaints in a young person match. Her mPAP seems better than it should be based on the echo.     I recommend re-consideration of sotatercept. I do not advocate further increase in existing PAH meds given mid/upper mid range cardiac index.     2) Cardiovascular - essential hypertension.     3) Endocrine  a. Hypothyroidism  b. Obesity (BMI = 37.2->36->34.22)        Plan    1) Continue macitentan and tadalafil -> combination pill for convenience, no change in remodulin  2) Follow up 3-4 months with 6 MW,   3) I recommend re-consideration of sotatercept. I do not advocate further increase in existing PAH meds given mid/upper mid range cardiac index.   4) If any further limited episodes of MARTE suggest or document increased HR, very low threshold for holter. To ED if prolonged.

## 2025-01-29 ENCOUNTER — HOSPITAL ENCOUNTER (OUTPATIENT)
Dept: CARDIOLOGY | Facility: HOSPITAL | Age: 45
Discharge: HOME | End: 2025-01-29
Payer: COMMERCIAL

## 2025-01-29 ENCOUNTER — OFFICE VISIT (OUTPATIENT)
Dept: PULMONOLOGY | Facility: HOSPITAL | Age: 45
End: 2025-01-29
Payer: COMMERCIAL

## 2025-01-29 ENCOUNTER — HOSPITAL ENCOUNTER (OUTPATIENT)
Dept: RESPIRATORY THERAPY | Facility: HOSPITAL | Age: 45
Discharge: HOME | End: 2025-01-29
Payer: COMMERCIAL

## 2025-01-29 VITALS
OXYGEN SATURATION: 99 % | HEART RATE: 73 BPM | DIASTOLIC BLOOD PRESSURE: 51 MMHG | BODY MASS INDEX: 33.04 KG/M2 | SYSTOLIC BLOOD PRESSURE: 110 MMHG | WEIGHT: 205.6 LBS | HEIGHT: 66 IN | RESPIRATION RATE: 20 BRPM

## 2025-01-29 DIAGNOSIS — I27.20 PULMONARY HYPERTENSION (MULTI): ICD-10-CM

## 2025-01-29 DIAGNOSIS — I27.0 PULMONARY HYPERTENSION, PRIMARY (MULTI): ICD-10-CM

## 2025-01-29 DIAGNOSIS — Z79.899 LONG-TERM USE OF HIGH-RISK MEDICATION: ICD-10-CM

## 2025-01-29 PROCEDURE — 99215 OFFICE O/P EST HI 40 MIN: CPT | Mod: 25 | Performed by: INTERNAL MEDICINE

## 2025-01-29 PROCEDURE — 3078F DIAST BP <80 MM HG: CPT | Performed by: INTERNAL MEDICINE

## 2025-01-29 PROCEDURE — 94618 PULMONARY STRESS TESTING: CPT

## 2025-01-29 PROCEDURE — 3074F SYST BP LT 130 MM HG: CPT | Performed by: INTERNAL MEDICINE

## 2025-01-29 PROCEDURE — 3008F BODY MASS INDEX DOCD: CPT | Performed by: INTERNAL MEDICINE

## 2025-01-29 PROCEDURE — 93306 TTE W/DOPPLER COMPLETE: CPT

## 2025-01-29 PROCEDURE — 93306 TTE W/DOPPLER COMPLETE: CPT | Performed by: INTERNAL MEDICINE

## 2025-01-29 PROCEDURE — 99215 OFFICE O/P EST HI 40 MIN: CPT | Performed by: INTERNAL MEDICINE

## 2025-01-29 RX ORDER — MACITENTAN AND TADALAFIL 10; 40 MG/1; MG/1
TABLET, FILM COATED ORAL DAILY
COMMUNITY

## 2025-01-29 ASSESSMENT — ENCOUNTER SYMPTOMS
DIARRHEA: 0
FEVER: 0
CHEST TIGHTNESS: 0
SHORTNESS OF BREATH: 1
PALPITATIONS: 0
VOMITING: 0
CHILLS: 0
LIGHT-HEADEDNESS: 0
UNEXPECTED WEIGHT CHANGE: 0
COUGH: 0
ACTIVITY CHANGE: 0
FATIGUE: 0
APPETITE CHANGE: 0
CONSTIPATION: 0
WHEEZING: 0
NAUSEA: 0
ABDOMINAL PAIN: 0
HEADACHES: 1
DIZZINESS: 1
ABDOMINAL DISTENTION: 0

## 2025-01-29 ASSESSMENT — PAIN SCALES - GENERAL: PAINLEVEL_OUTOF10: 0-NO PAIN

## 2025-01-30 DIAGNOSIS — I27.0 PULMONARY HYPERTENSION, PRIMARY (MULTI): ICD-10-CM

## 2025-01-30 DIAGNOSIS — R06.02 SHORTNESS OF BREATH: ICD-10-CM

## 2025-01-30 LAB
AORTIC VALVE MEAN GRADIENT: 9 MMHG
AORTIC VALVE PEAK VELOCITY: 2.07 M/S
AV PEAK GRADIENT: 17 MMHG
AVA (PEAK VEL): 2.28 CM2
AVA (VTI): 2.35 CM2
EJECTION FRACTION APICAL 4 CHAMBER: 70.1
EJECTION FRACTION: 74 %
LEFT VENTRICLE INTERNAL DIMENSION DIASTOLE: 5.2 CM (ref 3.5–6)
LEFT VENTRICULAR OUTFLOW TRACT DIAMETER: 2.2 CM
MITRAL VALVE E/A RATIO: 0.94
RIGHT VENTRICLE FREE WALL PEAK S': 17.5 CM/S
RIGHT VENTRICLE PEAK SYSTOLIC PRESSURE: 52.8 MMHG
TRICUSPID ANNULAR PLANE SYSTOLIC EXCURSION: 2.8 CM

## 2025-02-04 ENCOUNTER — DOCUMENTATION (OUTPATIENT)
Dept: PULMONOLOGY | Facility: HOSPITAL | Age: 45
End: 2025-02-04
Payer: COMMERCIAL

## 2025-02-04 NOTE — PROGRESS NOTES
Per [facility] Employee Plan, Winrevair 45mg kit approved from 02/04/2025-02/03/2026

## 2025-02-06 LAB
ALBUMIN SERPL-MCNC: 3.7 G/DL (ref 3.6–5.1)
ALP SERPL-CCNC: 44 U/L (ref 31–125)
ALT SERPL-CCNC: 12 U/L (ref 6–29)
ANION GAP SERPL CALCULATED.4IONS-SCNC: 9 MMOL/L (CALC) (ref 7–17)
AST SERPL-CCNC: 10 U/L (ref 10–30)
BILIRUB SERPL-MCNC: 0.3 MG/DL (ref 0.2–1.2)
BUN SERPL-MCNC: 15 MG/DL (ref 7–25)
CALCIUM SERPL-MCNC: 8.5 MG/DL (ref 8.6–10.2)
CHLORIDE SERPL-SCNC: 105 MMOL/L (ref 98–110)
CO2 SERPL-SCNC: 22 MMOL/L (ref 20–32)
CREAT SERPL-MCNC: 0.95 MG/DL (ref 0.5–0.99)
EGFRCR SERPLBLD CKD-EPI 2021: 76 ML/MIN/1.73M2
ERYTHROCYTE [DISTWIDTH] IN BLOOD BY AUTOMATED COUNT: 14.4 % (ref 11–15)
GLUCOSE SERPL-MCNC: 102 MG/DL (ref 65–139)
HCT VFR BLD AUTO: 37 % (ref 35–45)
HGB BLD-MCNC: 11.7 G/DL (ref 11.7–15.5)
MAGNESIUM SERPL-MCNC: 1.7 MG/DL (ref 1.5–2.5)
MCH RBC QN AUTO: 25.2 PG (ref 27–33)
MCHC RBC AUTO-ENTMCNC: 31.6 G/DL (ref 32–36)
MCV RBC AUTO: 79.7 FL (ref 80–100)
PLATELET # BLD AUTO: 361 THOUSAND/UL (ref 140–400)
PMV BLD REES-ECKER: 9.2 FL (ref 7.5–12.5)
POTASSIUM SERPL-SCNC: 3.8 MMOL/L (ref 3.5–5.3)
PROT SERPL-MCNC: 6 G/DL (ref 6.1–8.1)
RBC # BLD AUTO: 4.64 MILLION/UL (ref 3.8–5.1)
SODIUM SERPL-SCNC: 136 MMOL/L (ref 135–146)
WBC # BLD AUTO: 7.8 THOUSAND/UL (ref 3.8–10.8)

## 2025-02-13 ENCOUNTER — DOCUMENTATION (OUTPATIENT)
Dept: PULMONOLOGY | Facility: HOSPITAL | Age: 45
End: 2025-02-13
Payer: COMMERCIAL

## 2025-02-13 NOTE — PROGRESS NOTES
Sotatercept Monitoring    Dosing History:  1st Sotatercept dose given on 2/13/2025.   Starting Dose: 45 mg kit, 0.3 mg/kg. Inject 0.6 ml.   Original Target Dose: 90 mg kit, 0.7 mg/kg. Inject 1.3 ml.      Holding History: N/A    Date: 2/5/2025 Baseline HGB 11.7    Lab Results   Component Value Date    WBC 7.8 02/05/2025    HGB 11.7 02/05/2025    HCT 37.0 02/05/2025    MCV 79.7 (L) 02/05/2025     02/05/2025     Per Dr. Rodriguez, ok for next dose yes    Symptoms: black tarry stools/nosebleeds/heavy menstrual bleeding/petechia: None    Plan: Labs before next dose    Note from [facility] RN: The education on the preparation and administration of Winrevair went well. Patient was able to administer shot to themselves and is considered independent with Winrevair self administration.

## 2025-03-04 ENCOUNTER — DOCUMENTATION (OUTPATIENT)
Dept: PULMONOLOGY | Facility: HOSPITAL | Age: 45
End: 2025-03-04
Payer: COMMERCIAL

## 2025-03-04 LAB
BASOPHILS # BLD AUTO: 58 CELLS/UL (ref 0–200)
BASOPHILS NFR BLD AUTO: 0.7 %
EOSINOPHIL # BLD AUTO: 398 CELLS/UL (ref 15–500)
EOSINOPHIL NFR BLD AUTO: 4.8 %
ERYTHROCYTE [DISTWIDTH] IN BLOOD BY AUTOMATED COUNT: 14.9 % (ref 11–15)
HCT VFR BLD AUTO: 39.1 % (ref 35–45)
HGB BLD-MCNC: 12.4 G/DL (ref 11.7–15.5)
LYMPHOCYTES # BLD AUTO: 2092 CELLS/UL (ref 850–3900)
LYMPHOCYTES NFR BLD AUTO: 25.2 %
MCH RBC QN AUTO: 25.5 PG (ref 27–33)
MCHC RBC AUTO-ENTMCNC: 31.7 G/DL (ref 32–36)
MCV RBC AUTO: 80.5 FL (ref 80–100)
MONOCYTES # BLD AUTO: 581 CELLS/UL (ref 200–950)
MONOCYTES NFR BLD AUTO: 7 %
NEUTROPHILS # BLD AUTO: 5171 CELLS/UL (ref 1500–7800)
NEUTROPHILS NFR BLD AUTO: 62.3 %
PLATELET # BLD AUTO: 396 THOUSAND/UL (ref 140–400)
PMV BLD REES-ECKER: 9.2 FL (ref 7.5–12.5)
RBC # BLD AUTO: 4.86 MILLION/UL (ref 3.8–5.1)
WBC # BLD AUTO: 8.3 THOUSAND/UL (ref 3.8–10.8)

## 2025-03-04 NOTE — PROGRESS NOTES
Sotatercept Monitoring    Dosing History:  2nd Sotatercept dose to be given on 3/6/2025.   Starting Dose: 45 mg kit, 0.3 mg/kg. Inject 0.6 ml.   Original Target Dose: 90 mg kit, 0.7mg/kg. Inject 0.6 ml.      Holding History:  N/A    Date: 2/5/2025 Baseline HGB 11.7    Lab Results   Component Value Date    WBC 8.3 03/03/2025    HGB 12.4 03/03/2025    HCT 39.1 03/03/2025    MCV 80.5 03/03/2025     03/03/2025     Per Dr. Rodriguez, ok for next dose yes    Symptoms: black tarry stools/nosebleeds/heavy menstrual bleeding/petechia:    Plan:  Continue labs prior to dosing.

## 2025-03-17 DIAGNOSIS — R06.02 SHORTNESS OF BREATH: ICD-10-CM

## 2025-03-17 DIAGNOSIS — I27.0 PULMONARY HYPERTENSION, PRIMARY (MULTI): ICD-10-CM

## 2025-03-27 ENCOUNTER — DOCUMENTATION (OUTPATIENT)
Dept: PULMONOLOGY | Facility: HOSPITAL | Age: 45
End: 2025-03-27
Payer: COMMERCIAL

## 2025-03-27 NOTE — PROGRESS NOTES
Sotatercept Monitoring    Dosing History:  3rd Sotatercept dose to be given on 2/27/2025.   Starting Dose: 45 mg kit, 0.3 mg/kg. Inject 0.6 ml.   Original Target Dose: 90 mg kit, 0.7mg/kg. Inject 0.6 ml.      Holding History:  N/A    Date: 2/5/2025  Baseline HGB 11.7    Lab Results   Component Value Date    WBC 8.3 03/03/2025    HGB 12.4 03/03/2025    HCT 39.1 03/03/2025    MCV 80.5 03/03/2025     03/03/2025     Per Dr. Rodriguez, ok for next dose yes    Symptoms: black tarry stools/nosebleeds/heavy menstrual bleeding/petechia: Denies    Plan:  Continue labs prior to dosing

## 2025-03-31 ENCOUNTER — APPOINTMENT (OUTPATIENT)
Dept: PULMONOLOGY | Facility: HOSPITAL | Age: 45
End: 2025-03-31
Payer: COMMERCIAL

## 2025-03-31 ENCOUNTER — APPOINTMENT (OUTPATIENT)
Dept: RESPIRATORY THERAPY | Facility: HOSPITAL | Age: 45
End: 2025-03-31
Payer: COMMERCIAL

## 2025-03-31 ASSESSMENT — ENCOUNTER SYMPTOMS
LIGHT-HEADEDNESS: 0
VOMITING: 0
PALPITATIONS: 0
NAUSEA: 0
APPETITE CHANGE: 0
SHORTNESS OF BREATH: 1
CHILLS: 0
DIZZINESS: 1
CONSTIPATION: 0
CHEST TIGHTNESS: 0
ACTIVITY CHANGE: 0
ABDOMINAL PAIN: 0
WHEEZING: 0
ABDOMINAL DISTENTION: 0
HEADACHES: 1
UNEXPECTED WEIGHT CHANGE: 0
DIARRHEA: 0
FATIGUE: 0
COUGH: 0
FEVER: 0

## 2025-03-31 NOTE — PROGRESS NOTES
"History Of Present Illness  Chiquis Thompson is a 44 y.o. female presenting with idiopathic pulmonary arterial hypertension. Patient is NYHA Functional Class 1-2 and WHO Group 1. She was last seen in clinic on 1/29/2025. Patient was originally referred by Dr. Machado in 2021.     PAH Treatment:  Remodulin subcutaneous, 86.73 ng/kg/min, 51 uL/hr (2/14/2023)   Opsynvi (12/2024)   Winrevair (2/13/2025)  Infusion site: Abdomen, CDI  Treatment history:   Sildenafil (10/31/2020-3/2023) failed, missing afternoon doses  Selexipag 1600 mcg (10/31/2020-3/2023) transition to Remodulin subcutaneous  Opsumit (6/3/2020-12/2024)  Tadalafil (9/1/2020-12/2024)     Today's testing includes Echo, 6 MWT and Labs    Interval History   Stating the dizzy spells have increased. Hard to quantify, perhaps every other day, a couple time a day. Noticed after the second shot of Winrevair. Since then dizzy spells have been very intermittent. SOB about the same as last visit. Not liking the \"shot\", says she feels like the dizziness  increased after second shot.     Past Medical History  Patient Active Problem List   Diagnosis    Abnormal echocardiogram    Edema    Heart burn    Hypertension    Hypothyroidism    Pulmonary hypertension, primary (Multi)    Shortness of breath on exertion    Hypoxia    Light headedness    Obesity (BMI 30-39.9)    Palpitations    Pulmonary hypertension (Multi)    Right ventricular dysfunction    Nonrheumatic tricuspid valve regurgitation    Long-term use of high-risk medication    SOB (shortness of breath)        Surgical History  She has a past surgical history that includes Other surgical history (05/18/2020).     Social History  She reports that she has never smoked. She has never used smokeless tobacco. She reports that she does not drink alcohol and does not use drugs.    Family History  No family history on file.     Medications  Current Outpatient Medications:     cetirizine (ZyrTEC) 10 mg tablet, Take 1 tablet (10 " mg) by mouth once daily as needed. (Patient taking differently: Take 1 tablet (10 mg) by mouth once daily.), Disp: , Rfl:     EluRyng 0.12-0.015 mg/24 hr vaginal ring, Insert 1 each into the vagina every 28 (twenty-eight) days., Disp: , Rfl:     levothyroxine (Synthroid, Levoxyl) 75 mcg tablet, Take 1 tablet (75 mcg) by mouth once daily., Disp: , Rfl:     lisinopril 40 mg tablet, Take 1 tablet (40 mg) by mouth once daily., Disp: , Rfl:     macitentan (Opsumit) 10 mg tablet tablet, Take 1 tablet (10 mg) by mouth once daily. (Patient not taking: Reported on 1/29/2025), Disp: , Rfl:     macitentan-tadalafil (Opsynvi) 10-40 mg tablet, Take by mouth once daily., Disp: , Rfl:     omeprazole (PriLOSEC) 10 mg DR capsule, Take 1 capsule (10 mg) by mouth once daily in the morning. Take before meals., Disp: , Rfl:     tadalafil (tadalafil, antihypertensive,) 20 mg tablet, Take 2 tablets (40 mg) by mouth once daily. (Patient not taking: Reported on 1/29/2025), Disp: 60 tablet, Rfl: 11    torsemide (Demadex) 20 mg tablet, Take 2 tablets (40 mg) by mouth once daily. (Patient taking differently: Take 1 tablet (20 mg) by mouth once daily.), Disp: , Rfl:     treprostinil (Remodulin) 5 mg/mL solution, 2 mL (10,000,000 ng)., Disp: , Rfl:     Current Facility-Administered Medications:     perflutren lipid microspheres (Definity) injection 0.5-10 mL of dilution, 0.5-10 mL of dilution, intravenous, Once in imaging, Last Rodriguez, DO     Allergies  Patient has no known allergies.    Review of Systems   Constitutional:  Negative for activity change, appetite change, chills, fatigue, fever and unexpected weight change.   Respiratory:  Positive for shortness of breath. Negative for cough, chest tightness and wheezing.    Cardiovascular:  Negative for chest pain, palpitations and leg swelling.   Gastrointestinal:  Negative for abdominal distention, abdominal pain, constipation, diarrhea, nausea and vomiting.   Neurological:  Positive for  dizziness and headaches. Negative for syncope and light-headedness.       Last Recorded Vitals  There were no vitals taken for this visit.     Physical Exam  Constitutional:       Appearance: She is obese.   Eyes:      Pupils: Pupils are equal, round, and reactive to light.   Cardiovascular:      Rate and Rhythm: Normal rate and regular rhythm.      Heart sounds: Murmur heard.   Pulmonary:      Breath sounds: Normal breath sounds.   Abdominal:      General: Abdomen is flat.   Skin:     Findings: No rash.   Neurological:      General: No focal deficit present.   Psychiatric:         Mood and Affect: Mood normal.         Judgment: Judgment normal.            Relevant Results    6MWT (4/9/2025)  SP02-96%-91%  HR-   TAYLOR- 1-3  Actual Meters-  602m    6MWT (1/29/2025)  SP02-99%-91% on RA  HR-   TAYLOR- 0-4  Actual Meters- 596m    Echo (1/29/2025)   PHYSICIAN INTERPRETATION:  Left Ventricle: Left ventricular ejection fraction is normal, calculated by Rome's biplane at 74%. There are no regional left ventricular wall motion abnormalities. The left ventricular cavity size is normal. There is normal septal and normal posterior left ventricular wall thickness. Spectral Doppler shows a normal pattern of left ventricular diastolic filling.  Left Atrium: The left atrium is mildly dilated.  Right Ventricle: The right ventricle is mildly enlarged. There is normal right ventricular global systolic function.  Right Atrium: The right atrium is mildly dilated.  Aortic Valve: The aortic valve is trileaflet. The aortic valve dimensionless index is 0.62. There is no evidence of aortic valve regurgitation. The peak instantaneous gradient of the aortic valve is 17 mmHg. The mean gradient of the aortic valve is 9 mmHg.  Mitral Valve: The mitral valve is normal in structure. There is no evidence of mitral valve regurgitation.  Tricuspid Valve: The tricuspid valve is structurally normal. There is mild tricuspid regurgitation. The  Doppler estimated RVSP is moderately elevated at 52.8 mmHg.  Pulmonic Valve: The pulmonic valve is structurally normal. There is physiologic pulmonic valve regurgitation.  Pericardium: There is no pericardial effusion noted.  Aorta: The aortic root is normal. There is no dilatation of the ascending aorta.  Systemic Veins: The inferior vena cava appears normal in size, with IVC inspiratory collapse greater than 50%.        CONCLUSIONS:   1. Left ventricular ejection fraction is normal, calculated by Rome's biplane at 74%.   2. There is normal right ventricular global systolic function.   3. Mildly enlarged right ventricle.   4. The left atrium is mildly dilated.   5. Moderately elevated right ventricular systolic pressure.    6MWT (10/28/2024)  VB20-19-66% on room air  HR-  YISEL-0-4  Actual Meters-610m       RHC (10/14/2024) [facility]  PAP-55/21 (35)  PWP-15  CO/CI-6.1/3.1     6MWT (2024) room air  SPO2: 97/91  HR: 77/134  Yisel: 0/2  Meters: 610     Echo (2024)  Right Ventricle: The right ventricle is upper limits of normal in size. There is normal right ventricular global systolic function.  Right Atrium: The right atrium is normal in size.     6MWT (3/21/2024)  HR   Spo2 95 - 89 on room air  Yisel 0 - 6  Meters 594     Echo (3/21/2024)  Right Ventricle: The right ventricle is mildly enlarged. There is normal right ventricular global systolic function.  Right Atrium: The right atrium is mildly dilated.     6MWT (11/15/2023)  HR 85 - 134  Spo2 95 - 89  Yisel 0 - 6  607m     6MWT (2023)  SpO2: 98-91% RA  HR:   Yisel: 0-5  Actual meters: 612; predicted 418 meters     Echo (2023)    RV moderately enlarged with preserved longitudinal systolic function.   RA is mildly dilated.      6MWT (3/29/2023)  SpO2: 97-91% RA  HR:   Yisel: 0-6  Actual meters: 588; predicted 557m     Echo (2023)  moderately enlarged RV with normal function,  RA mildly dilated      RHC (2021)  [at rest]    Pap: 62/23 (21)  PW: 14  CO/CI: 6.5/3.2  [post exercise]  Pap 75/16 (48),   PW 27     RHC (5/27/2020)  PAP: 67/22(39)  PWP: 6  CO/CI: 5.3/2.4     CT chest WO IV Contrast (5/27/2020)   IMPRESSION:  1. Enlarged pulmonary artery and enlarged right ventricle, please  correlate with clinical concern of pulmonary arterial hypertension.  No evidence of pneumothorax, pleural effusion, consolidation.  2. Small pericardial effusion.  3. Slightly prominent cardiomediastinal and upper abdominal lymph  nodes measuring up to 6 mm, likely reactive in nature.  4. 9 mm left inferior pole thyroid nodule. Consider correlation with  thyroid ultrasound.    Assessment/Plan     1) Pulmonary - Echo, (-) V/Q, normal PFTs consistent with IPAH, FC 2+, some persistent edema now resolved with increased diuretic. Trial on triple oral which ultimately was transitioned to subcutaneous remodulin with improvement. Stable function today.      No edema now with torsemide and aldactone. Overall, patient better than last year, performance data however seems better than echo and although improving, believe we are not at goal and unlikely to get there on current regimen. Last mPAP was 48 mm Hg. (August 2021) I advocated for infusion prostacyclin. Patient reluctant but ultimately agreed in 2023, subcutaneous remodulin from selexipag.      10/28/2024 -   FC 1-2, very, very close to goal, significant improvement in mPAP to 32 mm Hg with PVR 3.3 MOLINA. Options at this point  - Increase prostacyclin and re-evaluate with time  - Consider adempas however 3x daily dosing in actively working patient will not favor compliance. Had to go to tadalfil due to sildenafil dose missing. Also has had lower BP with some light-headedness. Not a good candidate for riociguat.  - Sotatercept an option but discussed side effects including serious and non-serious bleeding. At this point, patient not inclined to assume risk. I agree given her status and low PVR.   - Experimental  meds an option in future as well.  1/29/2025 review  (10/14/2025)- Interval cath with mPAP =35 . PVR 3.3, CI =3.1, Patient no different , still with limitation, Echo today similar to March 2024. With RV:LV by my eye close to 1:1. Her echo and limitation/complaints in a young person match. Her mPAP seems better than it should be based on the echo.     I recommend re-consideration of sotatercept. I do not advocate further increase in existing PAH meds given mid/upper mid range cardiac index.     2) Cardiovascular - essential hypertension.     3) Endocrine  a. Hypothyroidism  b. Obesity (BMI = 37.2->36->34.22)        Plan    1) Continue Opsynvi, no change in remodulin. No change.  2) Follow up 3 months with 6 MW,   3) Continue sotatercept.   4) Holter to R/O SVT.

## 2025-04-07 ENCOUNTER — APPOINTMENT (OUTPATIENT)
Dept: RESPIRATORY THERAPY | Facility: HOSPITAL | Age: 45
End: 2025-04-07
Payer: COMMERCIAL

## 2025-04-07 ENCOUNTER — APPOINTMENT (OUTPATIENT)
Dept: PULMONOLOGY | Facility: HOSPITAL | Age: 45
End: 2025-04-07
Payer: COMMERCIAL

## 2025-04-07 PROBLEM — R60.0 LEG EDEMA: Status: ACTIVE | Noted: 2024-05-29

## 2025-04-07 RX ORDER — SPIRONOLACTONE 25 MG/1
TABLET ORAL
COMMUNITY
Start: 2025-01-31

## 2025-04-07 RX ORDER — LISINOPRIL 20 MG/1
1 TABLET ORAL
COMMUNITY
Start: 2025-03-03

## 2025-04-07 RX ORDER — SOTATERCEPT-CSRK 45 MG
KIT SUBCUTANEOUS
COMMUNITY
Start: 2025-03-24

## 2025-04-07 RX ORDER — POTASSIUM CHLORIDE 20 MEQ/1
TABLET, EXTENDED RELEASE ORAL
COMMUNITY
Start: 2025-01-31

## 2025-04-09 ENCOUNTER — HOSPITAL ENCOUNTER (OUTPATIENT)
Dept: RESPIRATORY THERAPY | Facility: HOSPITAL | Age: 45
Discharge: HOME | End: 2025-04-09
Payer: COMMERCIAL

## 2025-04-09 ENCOUNTER — OFFICE VISIT (OUTPATIENT)
Dept: PULMONOLOGY | Facility: HOSPITAL | Age: 45
End: 2025-04-09
Payer: COMMERCIAL

## 2025-04-09 VITALS
OXYGEN SATURATION: 96 % | WEIGHT: 210.8 LBS | DIASTOLIC BLOOD PRESSURE: 75 MMHG | SYSTOLIC BLOOD PRESSURE: 110 MMHG | BODY MASS INDEX: 33.88 KG/M2 | HEART RATE: 90 BPM | HEIGHT: 66 IN

## 2025-04-09 DIAGNOSIS — R06.02 SHORTNESS OF BREATH: ICD-10-CM

## 2025-04-09 DIAGNOSIS — R42 DIZZINESS: ICD-10-CM

## 2025-04-09 DIAGNOSIS — I27.0 PULMONARY HYPERTENSION, PRIMARY (MULTI): ICD-10-CM

## 2025-04-09 DIAGNOSIS — I27.0 IDIOPATHIC PAH (PULMONARY ARTERIAL HYPERTENSION) (MULTI): Primary | ICD-10-CM

## 2025-04-09 DIAGNOSIS — I27.20 PULMONARY HYPERTENSION (MULTI): ICD-10-CM

## 2025-04-09 DIAGNOSIS — Z79.899 LONG-TERM USE OF HIGH-RISK MEDICATION: ICD-10-CM

## 2025-04-09 PROCEDURE — 94618 PULMONARY STRESS TESTING: CPT

## 2025-04-09 PROCEDURE — 3008F BODY MASS INDEX DOCD: CPT | Performed by: INTERNAL MEDICINE

## 2025-04-09 PROCEDURE — 3074F SYST BP LT 130 MM HG: CPT | Performed by: INTERNAL MEDICINE

## 2025-04-09 PROCEDURE — 99215 OFFICE O/P EST HI 40 MIN: CPT | Performed by: INTERNAL MEDICINE

## 2025-04-09 PROCEDURE — 3078F DIAST BP <80 MM HG: CPT | Performed by: INTERNAL MEDICINE

## 2025-04-09 ASSESSMENT — PAIN SCALES - GENERAL: PAINLEVEL_OUTOF10: 0-NO PAIN

## 2025-04-16 ENCOUNTER — DOCUMENTATION (OUTPATIENT)
Dept: PULMONOLOGY | Facility: HOSPITAL | Age: 45
End: 2025-04-16
Payer: COMMERCIAL

## 2025-04-16 LAB
ALBUMIN SERPL-MCNC: 3.9 G/DL (ref 3.6–5.1)
ALP SERPL-CCNC: 50 U/L (ref 31–125)
ALT SERPL-CCNC: 16 U/L (ref 6–29)
ANION GAP SERPL CALCULATED.4IONS-SCNC: 9 MMOL/L (CALC) (ref 7–17)
AST SERPL-CCNC: 17 U/L (ref 10–30)
BILIRUB SERPL-MCNC: 0.4 MG/DL (ref 0.2–1.2)
BNP SERPL-MCNC: 16 PG/ML
BUN SERPL-MCNC: 16 MG/DL (ref 7–25)
CALCIUM SERPL-MCNC: 8.8 MG/DL (ref 8.6–10.2)
CHLORIDE SERPL-SCNC: 103 MMOL/L (ref 98–110)
CO2 SERPL-SCNC: 25 MMOL/L (ref 20–32)
CREAT SERPL-MCNC: 0.97 MG/DL (ref 0.5–0.99)
EGFRCR SERPLBLD CKD-EPI 2021: 74 ML/MIN/1.73M2
ERYTHROCYTE [DISTWIDTH] IN BLOOD BY AUTOMATED COUNT: 15.4 % (ref 11–15)
GLUCOSE SERPL-MCNC: 79 MG/DL (ref 65–139)
HCT VFR BLD AUTO: 43.8 % (ref 35–45)
HGB BLD-MCNC: 14.1 G/DL (ref 11.7–15.5)
MAGNESIUM SERPL-MCNC: 2 MG/DL (ref 1.5–2.5)
MCH RBC QN AUTO: 25.9 PG (ref 27–33)
MCHC RBC AUTO-ENTMCNC: 32.2 G/DL (ref 32–36)
MCV RBC AUTO: 80.4 FL (ref 80–100)
PLATELET # BLD AUTO: 356 THOUSAND/UL (ref 140–400)
PMV BLD REES-ECKER: 9 FL (ref 7.5–12.5)
POTASSIUM SERPL-SCNC: 4.6 MMOL/L (ref 3.5–5.3)
PROT SERPL-MCNC: 6.5 G/DL (ref 6.1–8.1)
RBC # BLD AUTO: 5.45 MILLION/UL (ref 3.8–5.1)
SODIUM SERPL-SCNC: 137 MMOL/L (ref 135–146)
WBC # BLD AUTO: 7.5 THOUSAND/UL (ref 3.8–10.8)

## 2025-04-16 NOTE — PROGRESS NOTES
Sotatercept Monitoring    Dosing History:  4th Sotatercept dose to be given on 4/16/2025.   Starting Dose: 45 mg kit, 0.3 mg/kg. Inject 0.6 ml.   Original Target Dose: 90 mg kit, 0.7mg/kg. Inject 0.6 ml.      Holding History:  N/A    Date: 2/5/2025 Baseline HGB 11.7    Lab Results   Component Value Date    WBC 7.5 04/15/2025    HGB 14.1 04/15/2025    HCT 43.8 04/15/2025    MCV 80.4 04/15/2025     04/15/2025       Per Dr. Rodriguez, ok for next dose: yes    Symptoms: black tarry stools/nosebleeds/heavy menstrual bleeding/petechia:    Plan:  Continue lab draws before each dose.

## 2025-05-02 ENCOUNTER — DOCUMENTATION (OUTPATIENT)
Dept: PULMONOLOGY | Facility: HOSPITAL | Age: 45
End: 2025-05-02
Payer: COMMERCIAL

## 2025-05-02 NOTE — PROGRESS NOTES
Patient called office.     Reporting she started cardiac monitor yesterday evening. Developed severe skin reaction. Removed monitor and replaced in another location. Overnight, additional skin reaction/rash developed. Patient to obtain new adhesive pads tomorrow to replace.

## 2025-05-07 ENCOUNTER — DOCUMENTATION (OUTPATIENT)
Dept: PULMONOLOGY | Facility: HOSPITAL | Age: 45
End: 2025-05-07
Payer: COMMERCIAL

## 2025-05-07 LAB
BASOPHILS # BLD AUTO: 60 CELLS/UL (ref 0–200)
BASOPHILS NFR BLD AUTO: 0.8 %
EOSINOPHIL # BLD AUTO: 263 CELLS/UL (ref 15–500)
EOSINOPHIL NFR BLD AUTO: 3.5 %
ERYTHROCYTE [DISTWIDTH] IN BLOOD BY AUTOMATED COUNT: 15.8 % (ref 11–15)
HCT VFR BLD AUTO: 42.5 % (ref 35–45)
HGB BLD-MCNC: 13.6 G/DL (ref 11.7–15.5)
LYMPHOCYTES # BLD AUTO: 1770 CELLS/UL (ref 850–3900)
LYMPHOCYTES NFR BLD AUTO: 23.6 %
MCH RBC QN AUTO: 25.9 PG (ref 27–33)
MCHC RBC AUTO-ENTMCNC: 32 G/DL (ref 32–36)
MCV RBC AUTO: 80.8 FL (ref 80–100)
MONOCYTES # BLD AUTO: 420 CELLS/UL (ref 200–950)
MONOCYTES NFR BLD AUTO: 5.6 %
NEUTROPHILS # BLD AUTO: 4988 CELLS/UL (ref 1500–7800)
NEUTROPHILS NFR BLD AUTO: 66.5 %
PLATELET # BLD AUTO: 314 THOUSAND/UL (ref 140–400)
PMV BLD REES-ECKER: 9.2 FL (ref 7.5–12.5)
QUEST TRACKING HOUSE ACCOUNT: NORMAL
RBC # BLD AUTO: 5.26 MILLION/UL (ref 3.8–5.1)
WBC # BLD AUTO: 7.5 THOUSAND/UL (ref 3.8–10.8)

## 2025-05-07 NOTE — PROGRESS NOTES
Sotatercept Monitoring    Dosing History:  5th Sotatercept dose to be given on 5/8/2025.   Starting Dose: 45 mg kit, 0.3 mg/kg. Inject 0.6 ml.   Original Target Dose: 90 mg kit, 0.7mg/kg. Inject 0.6 ml.      Holding History:  N/A    Date: 2/5/2025                 Baseline HGB 11.7    Lab Results   Component Value Date    WBC 7.5 05/06/2025    HGB 13.6 05/06/2025    HCT 42.5 05/06/2025    MCV 80.8 05/06/2025     05/06/2025       Per Dr. Rodriguez, ok for next dose: yes    Symptoms: black tarry stools/nosebleeds/heavy menstrual bleeding/petechia: denies    Plan:  Continue labs prior to each dose

## 2025-05-28 ENCOUNTER — PATIENT MESSAGE (OUTPATIENT)
Dept: PULMONOLOGY | Facility: HOSPITAL | Age: 45
End: 2025-05-28
Payer: COMMERCIAL

## 2025-05-29 ENCOUNTER — DOCUMENTATION (OUTPATIENT)
Dept: PULMONOLOGY | Facility: HOSPITAL | Age: 45
End: 2025-05-29
Payer: COMMERCIAL

## 2025-05-29 LAB
ALBUMIN SERPL-MCNC: 3.7 G/DL (ref 3.6–5.1)
ALP SERPL-CCNC: 47 U/L (ref 31–125)
ALT SERPL-CCNC: 14 U/L (ref 6–29)
ANION GAP SERPL CALCULATED.4IONS-SCNC: 7 MMOL/L (CALC) (ref 7–17)
AST SERPL-CCNC: 14 U/L (ref 10–30)
BASOPHILS # BLD AUTO: 39 CELLS/UL (ref 0–200)
BASOPHILS NFR BLD AUTO: 0.5 %
BILIRUB SERPL-MCNC: 0.4 MG/DL (ref 0.2–1.2)
BNP SERPL-MCNC: 10 PG/ML
BUN SERPL-MCNC: 14 MG/DL (ref 7–25)
CALCIUM SERPL-MCNC: 8.4 MG/DL (ref 8.6–10.2)
CHLORIDE SERPL-SCNC: 107 MMOL/L (ref 98–110)
CO2 SERPL-SCNC: 23 MMOL/L (ref 20–32)
CREAT SERPL-MCNC: 1.06 MG/DL (ref 0.5–0.99)
EGFRCR SERPLBLD CKD-EPI 2021: 66 ML/MIN/1.73M2
EOSINOPHIL # BLD AUTO: 193 CELLS/UL (ref 15–500)
EOSINOPHIL NFR BLD AUTO: 2.5 %
ERYTHROCYTE [DISTWIDTH] IN BLOOD BY AUTOMATED COUNT: 16.1 % (ref 11–15)
GLUCOSE SERPL-MCNC: 83 MG/DL (ref 65–99)
HCT VFR BLD AUTO: 41.6 % (ref 35–45)
HGB BLD-MCNC: 13.2 G/DL (ref 11.7–15.5)
LYMPHOCYTES # BLD AUTO: 1786 CELLS/UL (ref 850–3900)
LYMPHOCYTES NFR BLD AUTO: 23.2 %
MAGNESIUM SERPL-MCNC: 1.6 MG/DL (ref 1.5–2.5)
MCH RBC QN AUTO: 25.7 PG (ref 27–33)
MCHC RBC AUTO-ENTMCNC: 31.7 G/DL (ref 32–36)
MCV RBC AUTO: 80.9 FL (ref 80–100)
MONOCYTES # BLD AUTO: 400 CELLS/UL (ref 200–950)
MONOCYTES NFR BLD AUTO: 5.2 %
NEUTROPHILS # BLD AUTO: 5282 CELLS/UL (ref 1500–7800)
NEUTROPHILS NFR BLD AUTO: 68.6 %
PLATELET # BLD AUTO: 320 THOUSAND/UL (ref 140–400)
PMV BLD REES-ECKER: 9.3 FL (ref 7.5–12.5)
POTASSIUM SERPL-SCNC: 4 MMOL/L (ref 3.5–5.3)
PROT SERPL-MCNC: 5.8 G/DL (ref 6.1–8.1)
RBC # BLD AUTO: 5.14 MILLION/UL (ref 3.8–5.1)
SODIUM SERPL-SCNC: 137 MMOL/L (ref 135–146)
WBC # BLD AUTO: 7.7 THOUSAND/UL (ref 3.8–10.8)

## 2025-05-29 NOTE — PROGRESS NOTES
Sotatercept Monitoring    Dosing History:  6th Sotatercept dose to be given on 5/29/2025  Starting Dose: 45 mg kit, 0.3 mg/kg. Inject 0.6 ml.   Original Target Dose: 90 mg kit, 0.7mg/kg. Inject 0.6 ml.    Dosing Weight:   93.3 kg    Holding History:  N/A      Date: 2/5/2025                Baseline HGB 11.7    Lab Results   Component Value Date    WBC 7.7 05/28/2025    HGB 13.2 05/28/2025    HCT 41.6 05/28/2025    MCV 80.9 05/28/2025     05/28/2025       Per Dr. Rodriguez, ok for next dose: yes    Symptoms: black tarry stools/nosebleeds/heavy menstrual bleeding/petechia:  Denies    Plan:  Continue labs before dosing

## 2025-06-18 ENCOUNTER — DOCUMENTATION (OUTPATIENT)
Dept: PULMONOLOGY | Facility: HOSPITAL | Age: 45
End: 2025-06-18
Payer: COMMERCIAL

## 2025-06-18 LAB
BASOPHILS # BLD AUTO: 60 CELLS/UL (ref 0–200)
BASOPHILS NFR BLD AUTO: 0.9 %
EOSINOPHIL # BLD AUTO: 208 CELLS/UL (ref 15–500)
EOSINOPHIL NFR BLD AUTO: 3.1 %
ERYTHROCYTE [DISTWIDTH] IN BLOOD BY AUTOMATED COUNT: 16.5 % (ref 11–15)
HCT VFR BLD AUTO: 42.3 % (ref 35–45)
HGB BLD-MCNC: 13.5 G/DL (ref 11.7–15.5)
LYMPHOCYTES # BLD AUTO: 1755 CELLS/UL (ref 850–3900)
LYMPHOCYTES NFR BLD AUTO: 26.2 %
MCH RBC QN AUTO: 26.3 PG (ref 27–33)
MCHC RBC AUTO-ENTMCNC: 31.9 G/DL (ref 32–36)
MCV RBC AUTO: 82.5 FL (ref 80–100)
MONOCYTES # BLD AUTO: 449 CELLS/UL (ref 200–950)
MONOCYTES NFR BLD AUTO: 6.7 %
NEUTROPHILS # BLD AUTO: 4228 CELLS/UL (ref 1500–7800)
NEUTROPHILS NFR BLD AUTO: 63.1 %
PLATELET # BLD AUTO: 293 THOUSAND/UL (ref 140–400)
PMV BLD REES-ECKER: 8.8 FL (ref 7.5–12.5)
RBC # BLD AUTO: 5.13 MILLION/UL (ref 3.8–5.1)
WBC # BLD AUTO: 6.7 THOUSAND/UL (ref 3.8–10.8)

## 2025-06-18 NOTE — PROGRESS NOTES
Sotatercept Monitoring    Dosing History:  #7 Sotatercept dose to be given on 6/19/2025.   Starting Dose: 45 mg kit, 0.3 mg/kg. Inject 0.6 ml.   Original Target Dose: 90 mg kit, 0.7mg/kg. Inject 0.6 ml.      Dosing Weight:   93.3 kg    Holding History:  N/A    Date: 2/5/2025                          Baseline HGB 11.7    Labs obtained for this dose on 6/17/2025  Hg  13.5  Platelet  293    Per Dr. Rodriguez, ok for next dose: yes    Symptoms: black tarry stools/nosebleeds/heavy menstrual bleeding/petechia:  Denies    Plan:

## 2025-06-25 ASSESSMENT — ENCOUNTER SYMPTOMS
CONSTIPATION: 0
CHEST TIGHTNESS: 0
UNEXPECTED WEIGHT CHANGE: 0
HEADACHES: 1
APPETITE CHANGE: 0
ABDOMINAL DISTENTION: 0
COUGH: 0
LIGHT-HEADEDNESS: 0
FEVER: 0
DIZZINESS: 1
VOMITING: 0
ABDOMINAL PAIN: 0
DIARRHEA: 0
ACTIVITY CHANGE: 0
CHILLS: 0
WHEEZING: 0
NAUSEA: 0
SHORTNESS OF BREATH: 1

## 2025-06-25 NOTE — PROGRESS NOTES
History Of Present Illness  Chiquis Thompson is a 44 y.o. female presenting with idiopathic pulmonary arterial hypertension. Patient is NYHA Functional Class 1-2 and WHO Group 1. She was last seen in clinic on 4/9/2025. Patient was originally referred by Dr. Machado in 2021.     PAH Treatment:  Remodulin subcutaneous, 86.73 ng/kg/min, 51 uL/hr (2/14/2023)  Opsynvi (12/2024)   Winrevair (2/13/2025)  Oxygen 2LPM HS (does not wear)  Infusion site: Abdomen, CDI (Pt last changed 6/30/2025)  Treatment history:   Sildenafil (10/31/2020-3/2023) failed, missing afternoon doses  Selexipag 1600 mcg (10/31/2020-3/2023) transition to Remodulin subcutaneous  Opsumit (6/3/2020-12/2024)  Tadalafil (9/1/2020-12/2024)     Today's testing includes 6MWT and Labs    Interval History   Patient reporting she recently had a bike ride of about 6 miles. May have felt a little less winded on the ride back. Overall, does not feel any different after starting Winrevair. Headaches no worse than usual. Still having dizzy spells with position changes but having less. Reporting following Winrevair shots, she gets an area of warmth and redness surrounding injection area. Takes about 3-5 days to resolve.     Past Medical History  Patient Active Problem List   Diagnosis    Abnormal echocardiogram    Edema    Heart burn    Hypertension    Hypothyroidism    Pulmonary hypertension, primary (Multi)    Shortness of breath on exertion    Hypoxia    Light headedness    Obesity (BMI 30-39.9)    Palpitations    Pulmonary hypertension (Multi)    Right ventricular dysfunction    Nonrheumatic tricuspid valve regurgitation    Long-term use of high-risk medication    SOB (shortness of breath)    Leg edema        Surgical History  She has a past surgical history that includes Other surgical history (05/18/2020).     Social History  She reports that she has never smoked. She has never used smokeless tobacco. She reports that she does not drink alcohol and does not use  drugs.    Family History  No family history on file.     Medications  Current Outpatient Medications:     cetirizine (ZyrTEC) 10 mg tablet, Take 1 tablet (10 mg) by mouth once daily as needed., Disp: , Rfl:     EluRyng 0.12-0.015 mg/24 hr vaginal ring, Insert 1 each into the vagina every 28 (twenty-eight) days., Disp: , Rfl:     levothyroxine (Synthroid, Levoxyl) 75 mcg tablet, Take 1 tablet (75 mcg) by mouth once daily., Disp: , Rfl:     lisinopril 20 mg tablet, Take 1 tablet (20 mg) by mouth early in the morning.., Disp: , Rfl:     macitentan-tadalafil 10-40 mg tablet, Take 1 tablet by mouth once daily., Disp: , Rfl:     omeprazole (PriLOSEC) 10 mg DR capsule, Take 1 capsule (10 mg) by mouth once daily in the morning. Take before meals., Disp: , Rfl:     potassium chloride CR 20 mEq ER tablet, , Disp: , Rfl:     spironolactone (Aldactone) 25 mg tablet, , Disp: , Rfl:     torsemide (Demadex) 20 mg tablet, Take 2 tablets (40 mg) by mouth once daily. (Patient taking differently: Take 1 tablet (20 mg) by mouth once daily.), Disp: , Rfl:     treprostinil (Remodulin) 5 mg/mL solution, 2 mL (10,000,000 ng)., Disp: , Rfl:     Winrevair 45 mg kit, , Disp: , Rfl:     Current Facility-Administered Medications:     perflutren lipid microspheres (Definity) injection 0.5-10 mL of dilution, 0.5-10 mL of dilution, intravenous, Once in imaging, Last Rodriguez, DO     Allergies  Patient has no known allergies.    Review of Systems   Constitutional:  Negative for activity change, appetite change, chills, fatigue, fever and unexpected weight change.   Respiratory:  Positive for shortness of breath. Negative for cough, chest tightness and wheezing.    Cardiovascular:  Negative for chest pain, palpitations and leg swelling.   Gastrointestinal:  Negative for abdominal distention, abdominal pain, constipation, diarrhea, nausea and vomiting.   Neurological:  Positive for dizziness and headaches. Negative for syncope and light-headedness.        Last Recorded Vitals  There were no vitals taken for this visit.     Physical Exam  Constitutional:       Appearance: She is obese.   Eyes:      Pupils: Pupils are equal, round, and reactive to light.   Cardiovascular:      Rate and Rhythm: Normal rate and regular rhythm.      Heart sounds: Murmur heard.   Pulmonary:      Breath sounds: Normal breath sounds.   Abdominal:      General: Abdomen is flat.   Skin:     Findings: No rash.   Neurological:      General: No focal deficit present.   Psychiatric:         Mood and Affect: Mood normal.         Judgment: Judgment normal.            Relevant Results    6MWT (7/9/2025)  SP02- 99%-91% on RA  HR-104-132  TAYLOR-0-4  Actual Meters- 599m    6MWT (4/9/2025)  SP02-96%-91% on RA  HR-   TAYLOR- 1-3  Actual Meters-  602m    6MWT (1/29/2025)  SP02-99%-91% on RA  HR-   TAYLOR- 0-4  Actual Meters- 596m    Echo (1/29/2025)   PHYSICIAN INTERPRETATION:  Left Ventricle: Left ventricular ejection fraction is normal, calculated by Rome's biplane at 74%. There are no regional left ventricular wall motion abnormalities. The left ventricular cavity size is normal. There is normal septal and normal posterior left ventricular wall thickness. Spectral Doppler shows a normal pattern of left ventricular diastolic filling.  Left Atrium: The left atrium is mildly dilated.  Right Ventricle: The right ventricle is mildly enlarged. There is normal right ventricular global systolic function.  Right Atrium: The right atrium is mildly dilated.  Aortic Valve: The aortic valve is trileaflet. The aortic valve dimensionless index is 0.62. There is no evidence of aortic valve regurgitation. The peak instantaneous gradient of the aortic valve is 17 mmHg. The mean gradient of the aortic valve is 9 mmHg.  Mitral Valve: The mitral valve is normal in structure. There is no evidence of mitral valve regurgitation.  Tricuspid Valve: The tricuspid valve is structurally normal. There is mild  tricuspid regurgitation. The Doppler estimated RVSP is moderately elevated at 52.8 mmHg.  Pulmonic Valve: The pulmonic valve is structurally normal. There is physiologic pulmonic valve regurgitation.  Pericardium: There is no pericardial effusion noted.  Aorta: The aortic root is normal. There is no dilatation of the ascending aorta.  Systemic Veins: The inferior vena cava appears normal in size, with IVC inspiratory collapse greater than 50%.        CONCLUSIONS:   1. Left ventricular ejection fraction is normal, calculated by Rome's biplane at 74%.   2. There is normal right ventricular global systolic function.   3. Mildly enlarged right ventricle.   4. The left atrium is mildly dilated.   5. Moderately elevated right ventricular systolic pressure.    6MWT (10/28/2024)  HW16-02-74% on room air  HR-  YISEL-0-4  Actual Meters-610m       RHC (10/14/2024) [facility]  PAP-55/21 (35)  PWP-15  CO/CI-6.1/3.1     6MWT (2024) room air  SPO2: 97/91  HR: 77/134  Yisel: 0/2  Meters: 610     Echo (2024)  Right Ventricle: The right ventricle is upper limits of normal in size. There is normal right ventricular global systolic function.  Right Atrium: The right atrium is normal in size.     6MWT (3/21/2024)  HR   Spo2 95 - 89 on room air  Yisel 0 - 6  Meters 594     Echo (3/21/2024)  Right Ventricle: The right ventricle is mildly enlarged. There is normal right ventricular global systolic function.  Right Atrium: The right atrium is mildly dilated.     6MWT (11/15/2023)  HR 85 - 134  Spo2 95 - 89  Yisel 0 - 6  607m     6MWT (2023)  SpO2: 98-91% RA  HR:   Yisel: 0-5  Actual meters: 612; predicted 418 meters     Echo (2023)    RV moderately enlarged with preserved longitudinal systolic function.   RA is mildly dilated.      6MWT (3/29/2023)  SpO2: 97-91% RA  HR:   Yisel: 0-6  Actual meters: 588; predicted 557m     Echo (2023)  moderately enlarged RV with normal function,  RA mildly dilated       RHC (8/20/2021)  [at rest]   Pap: 62/23 (21)  PW: 14  CO/CI: 6.5/3.2  [post exercise]  Pap 75/16 (48),   PW 27     RHC (5/27/2020)  PAP: 67/22(39)  PWP: 6  CO/CI: 5.3/2.4     CT chest WO IV Contrast (5/27/2020)   IMPRESSION:  1. Enlarged pulmonary artery and enlarged right ventricle, please  correlate with clinical concern of pulmonary arterial hypertension.  No evidence of pneumothorax, pleural effusion, consolidation.  2. Small pericardial effusion.  3. Slightly prominent cardiomediastinal and upper abdominal lymph  nodes measuring up to 6 mm, likely reactive in nature.  4. 9 mm left inferior pole thyroid nodule. Consider correlation with  thyroid ultrasound.    Assessment/Plan     1) Pulmonary - Echo, (-) V/Q, normal PFTs consistent with IPAH, FC 2+, some persistent edema now resolved with increased diuretic. Trial on triple oral which ultimately was transitioned to subcutaneous remodulin with improvement. Stable function today.      No edema now with torsemide and aldactone. Overall, patient better than last year, performance data however seems better than echo and although improving, believe we are not at goal and unlikely to get there on current regimen. Last mPAP was 48 mm Hg. (August 2021) I advocated for infusion prostacyclin. Patient reluctant but ultimately agreed in 2023, subcutaneous remodulin from selexipag.      10/28/2024 -   FC 1-2, very, very close to goal, significant improvement in mPAP to 32 mm Hg with PVR 3.3 MOLINA. Options at this point  - Increase prostacyclin and re-evaluate with time  - Consider adempas however 3x daily dosing in actively working patient will not favor compliance. Had to go to tadalfil due to sildenafil dose missing. Also has had lower BP with some light-headedness. Not a good candidate for riociguat.  - Sotatercept an option but discussed side effects including serious and non-serious bleeding. At this point, patient not inclined to assume risk. I agree given her status and  low PVR.   - Experimental meds an option in future as well.    (1/14/2025)- Interval cath with mPAP =35 . PVR 3.3, CI =3.1, Patient no different , still with limitation, Echo today similar to March 2024. With RV:LV by my eye close to 1:1. Her echo and limitation/complaints in a young person match. Her mPAP seems better than it should be based on the echo.   I recommend re-consideration of sotatercept. I do not advocate further increase in existing PAH meds given mid/upper mid range cardiac index.    1/29/2025 reviewed    7/9/2025 - excellent 6 MW and subjective exercise tolerance. Painless rash from sotatercept.      2) Cardiovascular - essential hypertension.     3) Endocrine  a. Hypothyroidism  b. Obesity (BMI = 37.2->36->34.22)        Plan    1) Continue Opsynvi, no change in remodulin. No change.  2) Follow up 3 months with 6 MW 6 MW, echo   3) Continue sotatercept.   4) Again reminded patient about sun sensitivity and prostacyclins. Patient states she knows.

## 2025-07-07 ENCOUNTER — PATIENT MESSAGE (OUTPATIENT)
Dept: PULMONOLOGY | Facility: HOSPITAL | Age: 45
End: 2025-07-07
Payer: COMMERCIAL

## 2025-07-08 ENCOUNTER — DOCUMENTATION (OUTPATIENT)
Dept: PULMONOLOGY | Facility: HOSPITAL | Age: 45
End: 2025-07-08
Payer: COMMERCIAL

## 2025-07-08 LAB
ALBUMIN SERPL-MCNC: 3.8 G/DL (ref 3.6–5.1)
ALP SERPL-CCNC: 50 U/L (ref 31–125)
ALT SERPL-CCNC: 16 U/L (ref 6–29)
ANION GAP SERPL CALCULATED.4IONS-SCNC: 7 MMOL/L (CALC) (ref 7–17)
AST SERPL-CCNC: 17 U/L (ref 10–30)
BILIRUB SERPL-MCNC: 0.3 MG/DL (ref 0.2–1.2)
BNP SERPL-MCNC: 11 PG/ML
BUN SERPL-MCNC: 17 MG/DL (ref 7–25)
CALCIUM SERPL-MCNC: 8.8 MG/DL (ref 8.6–10.2)
CHLORIDE SERPL-SCNC: 106 MMOL/L (ref 98–110)
CO2 SERPL-SCNC: 24 MMOL/L (ref 20–32)
CREAT SERPL-MCNC: 0.98 MG/DL (ref 0.5–0.99)
EGFRCR SERPLBLD CKD-EPI 2021: 73 ML/MIN/1.73M2
ERYTHROCYTE [DISTWIDTH] IN BLOOD BY AUTOMATED COUNT: 15.9 % (ref 11–15)
GLUCOSE SERPL-MCNC: 80 MG/DL (ref 65–139)
HCT VFR BLD AUTO: 42.2 % (ref 35–45)
HGB BLD-MCNC: 13.2 G/DL (ref 11.7–15.5)
MAGNESIUM SERPL-MCNC: 1.9 MG/DL (ref 1.5–2.5)
MCH RBC QN AUTO: 25.7 PG (ref 27–33)
MCHC RBC AUTO-ENTMCNC: 31.3 G/DL (ref 32–36)
MCV RBC AUTO: 82.1 FL (ref 80–100)
PLATELET # BLD AUTO: 317 THOUSAND/UL (ref 140–400)
PMV BLD REES-ECKER: 8.9 FL (ref 7.5–12.5)
POTASSIUM SERPL-SCNC: 3.7 MMOL/L (ref 3.5–5.3)
PROT SERPL-MCNC: 6.2 G/DL (ref 6.1–8.1)
RBC # BLD AUTO: 5.14 MILLION/UL (ref 3.8–5.1)
SODIUM SERPL-SCNC: 137 MMOL/L (ref 135–146)
WBC # BLD AUTO: 8.1 THOUSAND/UL (ref 3.8–10.8)

## 2025-07-08 NOTE — PROGRESS NOTES
Sotatercept Monitoring    Dosing History:  #8 Sotatercept dose to be given on 7/10/2025.   Starting Dose: 45 mg kit, 0.3 mg/kg. Inject 0.6 ml.   Original Target Dose: 90 mg kit, 0.7mg/kg. Inject 0.6 ml.    Dosing Weight:   93.3 kg    Holding History:  N/A    Date: 2/5/2025                    Baseline HGB 11.7    Lab Results   Component Value Date    WBC 8.1 07/07/2025    HGB 13.2 07/07/2025    HCT 42.2 07/07/2025    MCV 82.1 07/07/2025     07/07/2025       Per Dr. Rodriguez, ok for next dose: yes    Symptoms: black tarry stools/nosebleeds/heavy menstrual bleeding/petechia:  Denies    Plan:  Will change labs to every other dose. Next labs due before 8/21/2025 dose.

## 2025-07-09 ENCOUNTER — APPOINTMENT (OUTPATIENT)
Dept: PULMONOLOGY | Facility: HOSPITAL | Age: 45
End: 2025-07-09
Payer: COMMERCIAL

## 2025-07-09 ENCOUNTER — HOSPITAL ENCOUNTER (OUTPATIENT)
Dept: RESPIRATORY THERAPY | Facility: HOSPITAL | Age: 45
Discharge: HOME | End: 2025-07-09
Payer: COMMERCIAL

## 2025-07-09 VITALS
SYSTOLIC BLOOD PRESSURE: 102 MMHG | HEIGHT: 66 IN | OXYGEN SATURATION: 95 % | DIASTOLIC BLOOD PRESSURE: 67 MMHG | HEART RATE: 104 BPM | WEIGHT: 213 LBS | BODY MASS INDEX: 34.23 KG/M2

## 2025-07-09 DIAGNOSIS — R06.02 SHORTNESS OF BREATH: ICD-10-CM

## 2025-07-09 DIAGNOSIS — Z79.899 LONG-TERM USE OF HIGH-RISK MEDICATION: ICD-10-CM

## 2025-07-09 DIAGNOSIS — I27.0 IDIOPATHIC PAH (PULMONARY ARTERIAL HYPERTENSION) (MULTI): Primary | ICD-10-CM

## 2025-07-09 DIAGNOSIS — I27.20 PULMONARY HYPERTENSION (MULTI): ICD-10-CM

## 2025-07-09 PROCEDURE — 3008F BODY MASS INDEX DOCD: CPT | Performed by: INTERNAL MEDICINE

## 2025-07-09 PROCEDURE — 3078F DIAST BP <80 MM HG: CPT | Performed by: INTERNAL MEDICINE

## 2025-07-09 PROCEDURE — 3074F SYST BP LT 130 MM HG: CPT | Performed by: INTERNAL MEDICINE

## 2025-07-09 PROCEDURE — 99212 OFFICE O/P EST SF 10 MIN: CPT | Performed by: INTERNAL MEDICINE

## 2025-07-09 PROCEDURE — 1036F TOBACCO NON-USER: CPT | Performed by: INTERNAL MEDICINE

## 2025-07-09 PROCEDURE — 99215 OFFICE O/P EST HI 40 MIN: CPT | Performed by: INTERNAL MEDICINE

## 2025-07-09 PROCEDURE — 94618 PULMONARY STRESS TESTING: CPT

## 2025-07-09 ASSESSMENT — ENCOUNTER SYMPTOMS
PALPITATIONS: 0
FATIGUE: 0

## 2025-07-09 ASSESSMENT — PAIN SCALES - GENERAL: PAINLEVEL_OUTOF10: 0-NO PAIN

## 2025-08-20 LAB
BASOPHILS # BLD AUTO: 47 CELLS/UL (ref 0–200)
BASOPHILS NFR BLD AUTO: 0.6 %
EOSINOPHIL # BLD AUTO: 237 CELLS/UL (ref 15–500)
EOSINOPHIL NFR BLD AUTO: 3 %
ERYTHROCYTE [DISTWIDTH] IN BLOOD BY AUTOMATED COUNT: 16 % (ref 11–15)
HCT VFR BLD AUTO: 40.7 % (ref 35–45)
HGB BLD-MCNC: 13 G/DL (ref 11.7–15.5)
LYMPHOCYTES # BLD AUTO: 1849 CELLS/UL (ref 850–3900)
LYMPHOCYTES NFR BLD AUTO: 23.4 %
MCH RBC QN AUTO: 26.6 PG (ref 27–33)
MCHC RBC AUTO-ENTMCNC: 31.9 G/DL (ref 32–36)
MCV RBC AUTO: 83.4 FL (ref 80–100)
MONOCYTES # BLD AUTO: 474 CELLS/UL (ref 200–950)
MONOCYTES NFR BLD AUTO: 6 %
NEUTROPHILS # BLD AUTO: 5293 CELLS/UL (ref 1500–7800)
NEUTROPHILS NFR BLD AUTO: 67 %
PLATELET # BLD AUTO: 261 THOUSAND/UL (ref 140–400)
PMV BLD REES-ECKER: 9.2 FL (ref 7.5–12.5)
QUEST DIFF COMMENT: ABNORMAL
RBC # BLD AUTO: 4.88 MILLION/UL (ref 3.8–5.1)
WBC # BLD AUTO: 7.9 THOUSAND/UL (ref 3.8–10.8)

## 2025-08-21 ENCOUNTER — DOCUMENTATION (OUTPATIENT)
Dept: PULMONOLOGY | Facility: HOSPITAL | Age: 45
End: 2025-08-21
Payer: COMMERCIAL

## 2025-10-21 ENCOUNTER — APPOINTMENT (OUTPATIENT)
Dept: RESPIRATORY THERAPY | Facility: HOSPITAL | Age: 45
End: 2025-10-21
Payer: COMMERCIAL

## 2025-10-21 ENCOUNTER — APPOINTMENT (OUTPATIENT)
Dept: PULMONOLOGY | Facility: HOSPITAL | Age: 45
End: 2025-10-21
Payer: COMMERCIAL

## 2025-10-21 ENCOUNTER — APPOINTMENT (OUTPATIENT)
Dept: CARDIOLOGY | Facility: HOSPITAL | Age: 45
End: 2025-10-21
Payer: COMMERCIAL